# Patient Record
Sex: FEMALE | Race: WHITE | Employment: FULL TIME | URBAN - METROPOLITAN AREA
[De-identification: names, ages, dates, MRNs, and addresses within clinical notes are randomized per-mention and may not be internally consistent; named-entity substitution may affect disease eponyms.]

---

## 2017-06-02 ENCOUNTER — OFFICE VISIT (OUTPATIENT)
Dept: HEMATOLOGY | Age: 57
End: 2017-06-02

## 2017-06-02 VITALS
DIASTOLIC BLOOD PRESSURE: 90 MMHG | WEIGHT: 139.2 LBS | SYSTOLIC BLOOD PRESSURE: 142 MMHG | TEMPERATURE: 98.5 F | HEART RATE: 71 BPM | OXYGEN SATURATION: 96 %

## 2017-06-02 DIAGNOSIS — E83.110 HEREDITARY HEMOCHROMATOSIS (HCC): Primary | ICD-10-CM

## 2017-06-02 PROBLEM — E83.119 HEMOCHROMATOSIS: Status: ACTIVE | Noted: 2017-06-02

## 2017-06-02 NOTE — PROGRESS NOTES
93 [facility] MD Imer, MONE Burt PA-C Nada Finger, MD, [place], [address], NP        [facility], [address] [address]  [address].     [phone]     FAX: 195 [address], [address] [place] [address], [address]     [phone]     FAX: [phone]       Patient Care Team:  Christopher Salazar MD (Gastroenterology)  Problem List  Never Reviewed          Codes Class Noted    Hemochromatosis ICD-10-CM: E83.119  ICD-9-CM: 275.03  6/2/2017            The physicians listed above have asked me to see Elina Stahl in consultation regarding Hemochromatosis and to perform assessment of fibrosis by means of in-office fibroscan analysis. Patient was diagnosed ~10 years ago and had been getting therapeutic phlebotomy every 6 weeks or so on an intermittent basis. This was apparently not inducing enough of a response and she has recently increased the frequency. Getting phlebotomy every 2 weeks for the past 6 weeks and the ferritin is being reassessed in the near future through Dr Shan Huizar office. She is tolerating this procedure reasonably well. Liver biopsy at the time of diagnosis 10 years ago, without significant increased fibrosis. The patient is a 61 y.o.  female who was diagnosed with liver disease in 2007. The patient has no symptoms which could be attributed to the liver disorder other than mild generalized itching. The patient completes all daily activities without any functional limitations.       The patient has not experienced fatigue, fevers, chills, shortness of breath, chest pain, pain in the right side over the liver, diffuse abdominal pain, nausea, vomiting, constipation, diarrhea, dry eyes, dry mouth, arthralgias, myalgias, yellowing of the eyes or skin, dark urine, or problems concentrating. ALLERGIES:  Not on File    MEDICATIONS:  No current outpatient prescriptions on file. No current facility-administered medications for this visit. SYSTEM REVIEW NOT RELATED TO LIVER DISEASE OR REVIEWED ABOVE:  Constitution systems: Negative for fever, chills, weight gain, weight loss. Eyes: Negative for visual changes. ENT: Negative for sore throat, painful swallowing. Respiratory: Negative for cough, hemoptysis, SOB. Cardiology: Negative for chest pain, palpitations. GI:  Negative for constipation or diarrhea. : Negative for urinary frequency, dysuria, hematuria, nocturia. Skin: Negative for rash. Hematology: Negative for easy bruising, blood clots. Musculo-skeletal: Negative for back pain, muscle pain, weakness. Neurologic: Negative for headaches, dizziness, vertigo, memory problems not related to HE. Psychology: Negative for anxiety, depression. FAMILY HISTORY:  A brother has hemochromatosis as well. SOCIAL HISTORY:  The patient is . The patient has 3 children, 2 of whom have been screened and are negative. The patient has never used tobacco products. The patient has never consumed significant amounts of alcohol. The patient currently works full time at [employer]. PHYSICAL EXAMINATION:  Visit Vitals    /90    Pulse 71    Temp 98.5 °F (36.9 °C) (Tympanic)    Wt 139 lb 3.2 oz (63.1 kg)    SpO2 96%     General: No acute distress. Skin: No spider angiomata. No jaundice. No palmar erythema. Abdomen: Soft non-tender. Liver size normal to percussion/palpation. Spleen not palpable. No obvious ascites. Extremities: No edema. No muscle wasting. No gross arthritic changes. Neurologic: Alert and oriented. Cranial nerves grossly intact. No asterixis. LIVER HISTOLOGY:  6/2017. FibroScan performed at [facility] [place]. EkPa was 4.9. Suggested fibrosis level is F0-1.     ASSESSMENT AND PLAN:  Hereditary hemochromatosis with no to minimal fibrosis. Assessment of fibrosis was made through performance of fibroscan in the office today. While this study has not been verified in the setting of hemochromatosis, her low score of <7 is consistent with no to minimal fibrosis for all other verified disease states. The results of the analysis were shared with the patient in the office at the time of the procedure. A copy of the report was provided to the patient and will be forwarded to the referring medical practice. All questions were answered. The patient expressed a clear understanding of the above. She will continue with biweekly therapeutic phlebotomy. Follow-up with referring physician.     Humble Burgos PA-C  Liver [place] [address], [address] [address]  [address]. [phone]

## 2017-06-02 NOTE — MR AVS SNAPSHOT
Visit Information Date & Time Provider Department Dept. Phone Encounter #  
 6/2/2017  1:30 PM [facility] maryann MOYA [phone] [id number] Upcoming Health Maintenance Date Due DTaP/Tdap/Td series (1 - Tdap) 9/10/1980 PAP AKA CERVICAL CYTOLOGY 9/10/1980 BREAST CANCER SCRN MAMMOGRAM 9/10/2009 FOBT Q 1 YEAR AGE 50-75 9/10/2009 INFLUENZA AGE 9 TO ADULT 8/1/2017 Allergies as of 6/2/2017  Review Complete On: 6/2/2017 By: John Lechuga Not on File Current Immunizations  Never Reviewed No immunizations on file. Not reviewed this visit Vitals BP Pulse Temp Weight(growth percentile) SpO2 Smoking Status 142/90 71 98.5 °F (36.9 °C) (Tympanic) 139 lb 3.2 oz (63.1 kg) 96% Current Every Day Smoker Your Updated Medication List  
  
Notice  As of 6/2/2017  1:32 PM  
 You have not been prescribed any medications. Introducing [facility]! Ivon Lane introduces [employer] patient portal. Now you can access parts of your medical record, email your doctor's office, and request medication refills online. 1. In your internet browser, go to [email or web address] 2. Click on the First Time User? Click Here link in the Sign In box. You will see the New Member Sign Up page. 3. Enter your [employer] Access Code exactly as it appears below. You will not need to use this code after youve completed the sign-up process. If you do not sign up before the expiration date, you must request a new code. · [employer] Access Code: [email or web address] Expires: 8/31/2017  1:32 PM 
 
4. Enter the last four digits of your Social Security Number (xxxx) and Date of Birth (mm/dd/yyyy) as indicated and click Submit. You will be taken to the next sign-up page. 5. Create a [employer] ID. This will be your [employer] login ID and cannot be changed, so think of one that is secure and easy to remember. 6. Create a [employer] password. You can change your password at any time. 7. Enter your Password Reset Question and Answer. This can be used at a later time if you forget your password. 8. Enter your e-mail address. You will receive e-mail notification when new information is available in [address] 9. Click Sign Up. You can now view and download portions of your medical record. 10. Click the Download Summary menu link to download a portable copy of your medical information. If you have questions, please visit the Frequently Asked Questions section of the [employer] website. Remember, [employer] is NOT to be used for urgent needs. For medical emergencies, dial 911. Now available from your iPhone and Android! Please provide this summary of care documentation to your next provider. If you have any questions after today's visit, please call [phone].

## 2019-08-30 ENCOUNTER — HOSPITAL ENCOUNTER (OUTPATIENT)
Dept: INFUSION THERAPY | Age: 59
Discharge: HOME | End: 2019-08-30
Payer: COMMERCIAL

## 2019-08-30 VITALS
OXYGEN SATURATION: 96 % | TEMPERATURE: 98.4 F | SYSTOLIC BLOOD PRESSURE: 136 MMHG | RESPIRATION RATE: 18 BRPM | DIASTOLIC BLOOD PRESSURE: 90 MMHG | HEART RATE: 81 BPM

## 2019-08-30 LAB
FERRITIN SERPL-MCNC: 257 NG/ML (ref 8–252)
HCT VFR BLD AUTO: 43.9 % (ref 35–47)
HGB BLD-MCNC: 15.2 G/DL (ref 11.5–16)

## 2019-08-30 PROCEDURE — 82728 ASSAY OF FERRITIN: CPT

## 2019-08-30 PROCEDURE — 36415 COLL VENOUS BLD VENIPUNCTURE: CPT

## 2019-08-30 PROCEDURE — 85018 HEMOGLOBIN: CPT

## 2019-08-30 PROCEDURE — 99195 PHLEBOTOMY: CPT

## 2019-08-30 NOTE — PROGRESS NOTES
[address] at [address] [place] [address] Ave    0382 Patient arrives for Therapeutic Phlebotomy without acute problems. Please see [facility] for complete assessment and education provided. *1 unit of blood phlebotomized from left arm without difficulty. Pt tolerated well. Vital signs stable throughout and prior to discharge, Pt. Tolerated treatment well and discharged without incident. Patient is aware of next [facility] appointment on 9/13/2019.      VITAL SIGNS   Patient Vitals for the past 12 hrs:   Temp Pulse Resp BP SpO2   08/30/19 1450  81  136/90    08/30/19 1250 98.4 °F (36.9 °C) 87 18 131/86 96 %       LAB WORK   Recent Results (from the past 12 hour(s))   FERRITIN    Collection Time: 08/30/19 12:51 PM   Result Value Ref Range    Ferritin 257 (H) 8 - 252 NG/ML   HGB & HCT    Collection Time: 08/30/19 12:51 PM   Result Value Ref Range    HGB 15.2 11.5 - 16.0 g/dL    HCT 43.9 35.0 - 47.0 %

## 2019-09-13 ENCOUNTER — HOSPITAL ENCOUNTER (OUTPATIENT)
Dept: INFUSION THERAPY | Age: 59
Discharge: HOME | End: 2019-09-13
Payer: COMMERCIAL

## 2019-09-13 VITALS
OXYGEN SATURATION: 97 % | HEART RATE: 76 BPM | RESPIRATION RATE: 18 BRPM | SYSTOLIC BLOOD PRESSURE: 109 MMHG | TEMPERATURE: 98.7 F | DIASTOLIC BLOOD PRESSURE: 75 MMHG

## 2019-09-13 LAB
FERRITIN SERPL-MCNC: 202 NG/ML (ref 26–388)
HCT VFR BLD AUTO: 40.1 % (ref 35–47)
HGB BLD-MCNC: 13.8 G/DL (ref 11.5–16)

## 2019-09-13 PROCEDURE — 82728 ASSAY OF FERRITIN: CPT

## 2019-09-13 PROCEDURE — 85018 HEMOGLOBIN: CPT

## 2019-09-13 PROCEDURE — 36415 COLL VENOUS BLD VENIPUNCTURE: CPT

## 2019-09-13 PROCEDURE — 99195 PHLEBOTOMY: CPT

## 2019-09-13 NOTE — PROGRESS NOTES
730 [facility] @ [facility] VISIT NOTE     3391 Patient arrives for Therapeutic Phelbotomy without acute problems. Please see connect care for complete assessment and education provided. 1unit/500mls drawn from R AC over about 10 minutes. Vital signs stable throughout and prior to discharge. Pt. Tolerated treatment well and discharged without incident. Patient/parent is aware of next [facility] appointment on 10/4/2019. Appointment card given to patient.      VITAL SIGNS   Patient Vitals for the past 12 hrs:   Temp Pulse Resp BP SpO2   09/13/19 1415  76 18 109/75    09/13/19 1303 98.7 °F (37.1 °C) 80 18 117/78 97 %     LAB WORK   Recent Results (from the past 12 hour(s))   FERRITIN    Collection Time: 09/13/19  1:07 PM   Result Value Ref Range    Ferritin 202 26 - 388 NG/ML   HGB & HCT    Collection Time: 09/13/19  1:07 PM   Result Value Ref Range    HGB 13.8 11.5 - 16.0 g/dL    HCT 40.1 35.0 - 47.0 %

## 2019-10-04 ENCOUNTER — HOSPITAL ENCOUNTER (OUTPATIENT)
Dept: INFUSION THERAPY | Age: 59
Discharge: HOME | End: 2019-10-04
Payer: COMMERCIAL

## 2019-10-04 VITALS
SYSTOLIC BLOOD PRESSURE: 118 MMHG | RESPIRATION RATE: 16 BRPM | TEMPERATURE: 97.9 F | OXYGEN SATURATION: 95 % | DIASTOLIC BLOOD PRESSURE: 77 MMHG | HEART RATE: 102 BPM

## 2019-10-04 LAB
FERRITIN SERPL-MCNC: 159 NG/ML (ref 8–252)
HCT VFR BLD AUTO: 43.3 % (ref 35–47)
HGB BLD-MCNC: 15.1 G/DL (ref 11.5–16)

## 2019-10-04 PROCEDURE — 99195 PHLEBOTOMY: CPT

## 2019-10-04 PROCEDURE — 36415 COLL VENOUS BLD VENIPUNCTURE: CPT

## 2019-10-04 PROCEDURE — 82728 ASSAY OF FERRITIN: CPT

## 2019-10-04 PROCEDURE — 85018 HEMOGLOBIN: CPT

## 2019-10-04 NOTE — PROGRESS NOTES
730 [address] [facility] @ [facility] VISIT NOTE    1300 Patient arrives for Therapeutic Phlebotomy every 2 weeks without acute problems. Please see connect care for complete assessment and education provided. Vital signs stable throughout and prior to discharge, Pt. Tolerated treatment well and discharged without incident. Patient is aware of next [facility] appointment on 10/25/2019. Appointment card given to patient.     VITAL SIGNS   Patient Vitals for the past 12 hrs:   Temp Pulse Resp BP SpO2   10/04/19 1401  (!) 102 16 118/77    10/04/19 1305 97.9 °F (36.6 °C) 95 18 111/76 95 %       Phlebotomized 1 unit per MD order    LAB WORK   Recent Results (from the past 12 hour(s))   HGB & HCT    Collection Time: 10/04/19  1:01 PM   Result Value Ref Range    HGB 15.1 11.5 - 16.0 g/dL    HCT 43.3 35.0 - 47.0 %   FERRITIN    Collection Time: 10/04/19  1:01 PM   Result Value Ref Range    Ferritin 159 8 - 252 NG/ML       Allyn Salazar RN, BSN, CPN, Clin 4

## 2019-10-25 ENCOUNTER — HOSPITAL ENCOUNTER (OUTPATIENT)
Dept: INFUSION THERAPY | Age: 59
Discharge: HOME | End: 2019-10-25
Payer: COMMERCIAL

## 2019-10-25 VITALS
TEMPERATURE: 98.2 F | DIASTOLIC BLOOD PRESSURE: 61 MMHG | RESPIRATION RATE: 18 BRPM | HEART RATE: 92 BPM | SYSTOLIC BLOOD PRESSURE: 106 MMHG

## 2019-10-25 LAB
FERRITIN SERPL-MCNC: 122 NG/ML (ref 8–252)
HCT VFR BLD AUTO: 43.4 % (ref 35–47)
HGB BLD-MCNC: 14.9 G/DL (ref 11.5–16)

## 2019-10-25 PROCEDURE — 85018 HEMOGLOBIN: CPT

## 2019-10-25 PROCEDURE — 99195 PHLEBOTOMY: CPT

## 2019-10-25 PROCEDURE — 82728 ASSAY OF FERRITIN: CPT

## 2019-10-25 PROCEDURE — 36415 COLL VENOUS BLD VENIPUNCTURE: CPT

## 2019-10-25 NOTE — PROGRESS NOTES
Outpatient Infusion Center Progress Note    1300 Pt admit to [place] for labs and possible therapeutic phlebotomy ambulatory in stable condition. Assessment completed. No new concerns voiced. Labs drawn peripherally and sent for processing. Visit Vitals  /61 (BP 1 Location: Right arm, BP Patient Position: Sitting)   Pulse 92   Temp 98.2 °F (36.8 °C)   Resp 18       Based on lab results, therapeutic phlebotomy is indicated. 500 cc red blood removed from left AC via 16 gauge needle. Needle removed and pressure applied to site until bleeding stopped. Site wrapped with coban. 1435 Pt tolerated treatment well. D/c home ambulatory in no distress. Pt aware of next appointment scheduled for 11/15/19.     Recent Results (from the past 12 hour(s))   FERRITIN    Collection Time: 10/25/19  1:05 PM   Result Value Ref Range    Ferritin 122 8 - 252 NG/ML   HGB & HCT    Collection Time: 10/25/19  1:05 PM   Result Value Ref Range    HGB 14.9 11.5 - 16.0 g/dL    HCT 43.4 35.0 - 47.0 %

## 2019-11-15 ENCOUNTER — HOSPITAL ENCOUNTER (OUTPATIENT)
Dept: INFUSION THERAPY | Age: 59
Discharge: HOME | End: 2019-11-15
Payer: COMMERCIAL

## 2019-11-15 VITALS
DIASTOLIC BLOOD PRESSURE: 75 MMHG | RESPIRATION RATE: 16 BRPM | TEMPERATURE: 97.5 F | OXYGEN SATURATION: 98 % | SYSTOLIC BLOOD PRESSURE: 110 MMHG | HEART RATE: 88 BPM

## 2019-11-15 LAB
FERRITIN SERPL-MCNC: 104 NG/ML (ref 26–388)
HCT VFR BLD AUTO: 42.9 % (ref 35–47)
HGB BLD-MCNC: 14.8 G/DL (ref 11.5–16)

## 2019-11-15 PROCEDURE — 99195 PHLEBOTOMY: CPT

## 2019-11-15 PROCEDURE — 85018 HEMOGLOBIN: CPT

## 2019-11-15 PROCEDURE — 36415 COLL VENOUS BLD VENIPUNCTURE: CPT

## 2019-11-15 PROCEDURE — 82728 ASSAY OF FERRITIN: CPT

## 2019-11-15 NOTE — PROGRESS NOTES
PEDI [facility] VISIT NOTE      1300 Patient arrives for Therapeutic Phlebotomy/lab without acute problems. Please see [facility] for complete assessment and education provided. Peripheral stick to left  hand for lab draw; labs sent for processing. Vitals Signs:  Patient Vitals for the past 12 hrs:   Temp Pulse Resp BP SpO2   11/15/19 1420  88 16 110/75    11/15/19 1301 97.5 °F (36.4 °C) 94 16 104/57 98 %          Labs:  Recent Results (from the past 12 hour(s))   FERRITIN    Collection Time: 11/15/19  1:04 PM   Result Value Ref Range    Ferritin 104 26 - 388 NG/ML   HGB & HCT    Collection Time: 11/15/19  1:04 PM   Result Value Ref Range    HGB 14.8 11.5 - 16.0 g/dL    HCT 42.9 35.0 - 47.0 %     Patient's labs within parameters for treatment; 16 gauge needle used for phlebotomy; 500ml of whole blood removed from the right ac, patient tolerated well. Gauze placed over site and pressure bandage applied. 1430 Vital signs stable throughout and prior to discharge, Patient tolerated treatment well and discharged without incident. Patient/parent is aware of next [facility] appointment.      Future Appointments   Date Time Provider [place]   12/6/2019  1:00 PM [facility]

## 2019-12-06 ENCOUNTER — APPOINTMENT (OUTPATIENT)
Dept: INFUSION THERAPY | Age: 59
End: 2019-12-06
Payer: COMMERCIAL

## 2019-12-11 ENCOUNTER — HOSPITAL ENCOUNTER (OUTPATIENT)
Dept: INFUSION THERAPY | Age: 59
Discharge: HOME | End: 2019-12-11
Payer: COMMERCIAL

## 2019-12-11 VITALS
RESPIRATION RATE: 14 BRPM | DIASTOLIC BLOOD PRESSURE: 71 MMHG | OXYGEN SATURATION: 96 % | TEMPERATURE: 97.6 F | SYSTOLIC BLOOD PRESSURE: 101 MMHG | HEART RATE: 85 BPM

## 2019-12-11 LAB
FERRITIN SERPL-MCNC: 91 NG/ML (ref 26–388)
HCT VFR BLD AUTO: 43.7 % (ref 35–47)
HGB BLD-MCNC: 15.3 G/DL (ref 11.5–16)

## 2019-12-11 PROCEDURE — 82728 ASSAY OF FERRITIN: CPT

## 2019-12-11 PROCEDURE — 36415 COLL VENOUS BLD VENIPUNCTURE: CPT

## 2019-12-11 PROCEDURE — 99195 PHLEBOTOMY: CPT

## 2019-12-11 PROCEDURE — 85018 HEMOGLOBIN: CPT

## 2019-12-11 NOTE — PROGRESS NOTES
730 [facility] @ [facility] VISIT NOTE    1311 Patient arrives for Therapeutic Phlebotomy every 2 weeks without acute problems. Please see connect care for complete assessment and education provided. Vital signs stable throughout and prior to discharge, Pt. Tolerated treatment well and discharged without incident. Patient is aware of next [facility] appointment on 12/27/2019. Appointment card given to patient.     VITAL SIGNS   Patient Vitals for the past 12 hrs:   Temp Pulse Resp BP SpO2   12/11/19 1407  85 14 101/71    12/11/19 1316 97.6 °F (36.4 °C) 81 16 118/80 96 %     Labs Drawn from Left [facility]  Phlebotomized 1 unit per MD order from Right AC with 16G needle    LAB WORK   Recent Results (from the past 12 hour(s))   FERRITIN    Collection Time: 12/11/19  1:13 PM   Result Value Ref Range    Ferritin 91 26 - 388 NG/ML   HGB & HCT    Collection Time: 12/11/19  1:13 PM   Result Value Ref Range    HGB 15.3 11.5 - 16.0 g/dL    HCT 43.7 35.0 - 47.0 %         Joyce Carpenter RN, BSN, CPN, Clin 4

## 2019-12-27 ENCOUNTER — APPOINTMENT (OUTPATIENT)
Dept: INFUSION THERAPY | Age: 59
End: 2019-12-27
Payer: COMMERCIAL

## 2020-01-10 ENCOUNTER — HOSPITAL ENCOUNTER (OUTPATIENT)
Dept: INFUSION THERAPY | Age: 60
Discharge: HOME | End: 2020-01-10
Payer: COMMERCIAL

## 2020-01-10 VITALS
SYSTOLIC BLOOD PRESSURE: 120 MMHG | RESPIRATION RATE: 16 BRPM | HEART RATE: 106 BPM | DIASTOLIC BLOOD PRESSURE: 49 MMHG | OXYGEN SATURATION: 96 % | TEMPERATURE: 97.4 F

## 2020-01-10 LAB
FERRITIN SERPL-MCNC: 111 NG/ML (ref 8–252)
HCT VFR BLD AUTO: 45.7 % (ref 35–47)
HGB BLD-MCNC: 16.2 G/DL (ref 11.5–16)

## 2020-01-10 PROCEDURE — 36415 COLL VENOUS BLD VENIPUNCTURE: CPT

## 2020-01-10 PROCEDURE — 85018 HEMOGLOBIN: CPT

## 2020-01-10 PROCEDURE — 99195 PHLEBOTOMY: CPT

## 2020-01-10 PROCEDURE — 82728 ASSAY OF FERRITIN: CPT

## 2020-01-10 RX ORDER — LANOLIN ALCOHOL/MO/W.PET/CERES
1 CREAM (GRAM) TOPICAL DAILY
COMMUNITY

## 2020-01-10 RX ORDER — MELATONIN
2000 DAILY
COMMUNITY

## 2020-01-10 NOTE — PROGRESS NOTES
730 [address] [facility] @ [facility] VISIT NOTE     0695 Patient arrives for Therapeutic Phlebotomy every 2 Weeks without acute problems. Please see connect care for complete assessment and education provided. Vital signs stable throughout and prior to discharge, Pt. Tolerated treatment well and discharged without incident. Patient is aware of next [facility] appointment on 01/24/2020. Appointment card given to patient. VITAL SIGNS  Patient Vitals for the past 12 hrs:   Temp Pulse Resp BP SpO2   01/10/20 1437  (!) 106 16 120/49    01/10/20 1310     96 %   01/10/20 1305 97.4 °F (36.3 °C) 98 16 115/79 96 %     Labs drawn via Right AV  Patient Phlebotomized 1 unit per MD order from Right AC with 16G Needle.      LAB WORK  Recent Results (from the past 12 hour(s))   HGB & HCT    Collection Time: 01/10/20  1:05 PM   Result Value Ref Range    HGB 16.2 (H) 11.5 - 16.0 g/dL    HCT 45.7 35.0 - 47.0 %   FERRITIN    Collection Time: 01/10/20  1:05 PM   Result Value Ref Range    Ferritin 111 8 - 252 NG/ML

## 2020-01-24 ENCOUNTER — HOSPITAL ENCOUNTER (OUTPATIENT)
Dept: INFUSION THERAPY | Age: 60
End: 2020-01-24
Payer: COMMERCIAL

## 2020-01-31 ENCOUNTER — HOSPITAL ENCOUNTER (OUTPATIENT)
Dept: INFUSION THERAPY | Age: 60
Discharge: HOME | End: 2020-01-31
Payer: COMMERCIAL

## 2020-01-31 VITALS
TEMPERATURE: 97.2 F | HEART RATE: 90 BPM | DIASTOLIC BLOOD PRESSURE: 72 MMHG | SYSTOLIC BLOOD PRESSURE: 111 MMHG | RESPIRATION RATE: 16 BRPM

## 2020-01-31 LAB
FERRITIN SERPL-MCNC: 91 NG/ML (ref 8–252)
HCT VFR BLD AUTO: 41.7 % (ref 35–47)
HGB BLD-MCNC: 14.5 G/DL (ref 11.5–16)

## 2020-01-31 PROCEDURE — 85018 HEMOGLOBIN: CPT

## 2020-01-31 PROCEDURE — 82728 ASSAY OF FERRITIN: CPT

## 2020-01-31 PROCEDURE — 99195 PHLEBOTOMY: CPT

## 2020-01-31 PROCEDURE — 36415 COLL VENOUS BLD VENIPUNCTURE: CPT

## 2020-01-31 NOTE — PROGRESS NOTES
730 [facility] @ [facility] VISIT NOTE    7866 Patient arrives for Therapeutic Phlebotomy every 2 weeks without acute problems. Please see connect care for complete assessment and education provided. Vital signs stable throughout and prior to discharge, Pt. Tolerated treatment well and discharged without incident. Patient is aware of next [facility] appointment on 2/21/2020. Appointment card given to patient.     VITAL SIGNS   Patient Vitals for the past 12 hrs:   Temp Pulse Resp BP   01/31/20 1408 97.2 °F (36.2 °C) 90 16 111/72       Labs Drawn from Right [facility]  Phlebotomized 1 unit per MD order from Left AC with 16G needle      LAB WORK   Recent Results (from the past 12 hour(s))   HGB & HCT    Collection Time: 01/31/20  2:05 PM   Result Value Ref Range    HGB 14.5 11.5 - 16.0 g/dL    HCT 41.7 35.0 - 47.0 %

## 2020-02-07 ENCOUNTER — APPOINTMENT (OUTPATIENT)
Dept: INFUSION THERAPY | Age: 60
End: 2020-02-07
Payer: COMMERCIAL

## 2020-02-21 ENCOUNTER — HOSPITAL ENCOUNTER (OUTPATIENT)
Dept: INFUSION THERAPY | Age: 60
Discharge: HOME | End: 2020-02-21
Payer: COMMERCIAL

## 2020-02-21 ENCOUNTER — APPOINTMENT (OUTPATIENT)
Dept: INFUSION THERAPY | Age: 60
End: 2020-02-21
Payer: COMMERCIAL

## 2020-02-21 VITALS
SYSTOLIC BLOOD PRESSURE: 113 MMHG | RESPIRATION RATE: 14 BRPM | TEMPERATURE: 97.7 F | DIASTOLIC BLOOD PRESSURE: 78 MMHG | HEART RATE: 84 BPM

## 2020-02-21 LAB
FERRITIN SERPL-MCNC: 68 NG/ML (ref 26–388)
HCT VFR BLD AUTO: 41.6 % (ref 35–47)
HGB BLD-MCNC: 14.6 G/DL (ref 11.5–16)

## 2020-02-21 PROCEDURE — 99195 PHLEBOTOMY: CPT

## 2020-02-21 PROCEDURE — 82728 ASSAY OF FERRITIN: CPT

## 2020-02-21 PROCEDURE — 85018 HEMOGLOBIN: CPT

## 2020-02-21 PROCEDURE — 36415 COLL VENOUS BLD VENIPUNCTURE: CPT

## 2020-02-21 RX ORDER — VARENICLINE TARTRATE 1 MG/1
1 TABLET, FILM COATED ORAL 2 TIMES DAILY
COMMUNITY
End: 2020-05-01

## 2020-02-21 NOTE — PROGRESS NOTES
730 [address] [facility] @ [facility] VISIT NOTE    2800 Patient arrives for Therapeutic Phlebotomy every 2 weeks without acute problems. Please see connect care for complete assessment and education provided. Vital signs stable throughout and prior to discharge, Pt. Tolerated treatment well and discharged without incident. Patient is aware of next [facility] appointment on 3/6/2020. Appointment card given to patient.     VITAL SIGNS   Patient Vitals for the past 12 hrs:   Temp Pulse Resp BP   02/21/20 1523  84 14 113/78   02/21/20 1408 97.7 °F (36.5 °C) 81 16 115/75     Labs Drawn from Right AC  Phlebotomized 1 unit per MD order from [address] [place] [address] 16G needle    LAB WORK   Recent Results (from the past 12 hour(s))   FERRITIN    Collection Time: 02/21/20  2:05 PM   Result Value Ref Range    Ferritin 68 26 - 388 NG/ML   HGB & HCT    Collection Time: 02/21/20  2:05 PM   Result Value Ref Range    HGB 14.6 11.5 - 16.0 g/dL    HCT 41.6 35.0 - 47.0 %

## 2020-03-06 ENCOUNTER — HOSPITAL ENCOUNTER (OUTPATIENT)
Dept: INFUSION THERAPY | Age: 60
Discharge: HOME | End: 2020-03-06
Payer: COMMERCIAL

## 2020-03-06 VITALS
SYSTOLIC BLOOD PRESSURE: 122 MMHG | HEART RATE: 82 BPM | TEMPERATURE: 98.2 F | DIASTOLIC BLOOD PRESSURE: 76 MMHG | RESPIRATION RATE: 16 BRPM

## 2020-03-06 LAB
FERRITIN SERPL-MCNC: 47 NG/ML (ref 26–388)
HCT VFR BLD AUTO: 40.2 % (ref 35–47)
HGB BLD-MCNC: 13.9 G/DL (ref 11.5–16)

## 2020-03-06 PROCEDURE — 36415 COLL VENOUS BLD VENIPUNCTURE: CPT

## 2020-03-06 PROCEDURE — 82728 ASSAY OF FERRITIN: CPT

## 2020-03-06 PROCEDURE — 85018 HEMOGLOBIN: CPT

## 2020-03-06 NOTE — PROGRESS NOTES
PEDI [facility] VISIT NOTE      9512 Patient arrives for Therapeutic Phlebotomy/lab q 2 Weekswithout acute problems. Please see connect [facility] for complete assessment and education provided. Peripheral stick to left hand for lab draw; labs sent for processing. Vitals Signs:  Patient Vitals for the past 12 hrs:   Temp Pulse Resp BP   03/06/20 1425 98.2 °F (36.8 °C) 82 16 122/76     Labs:  Recent Results (from the past 12 hour(s))   HGB & HCT    Collection Time: 03/06/20  2:34 PM   Result Value Ref Range    HGB 13.9 11.5 - 16.0 g/dL    HCT 40.2 35.0 - 47.0 %   FERRITIN    Collection Time: 03/06/20  2:34 PM   Result Value Ref Range    Ferritin 47 26 - 388 NG/ML     Patient's Phlebotomy held due to lab not in parameters for treatment; patient's Ferritin = 47 with MD orders to pull blood for Ferritin > 50. Vital signs stable throughout and prior to discharge, Patient tolerated treatment well and discharged without incident. Patient is aware of next [facility] appointment 03/20/2020.

## 2020-03-20 ENCOUNTER — HOSPITAL ENCOUNTER (OUTPATIENT)
Dept: INFUSION THERAPY | Age: 60
End: 2020-03-20
Payer: COMMERCIAL

## 2020-04-03 ENCOUNTER — HOSPITAL ENCOUNTER (OUTPATIENT)
Dept: INFUSION THERAPY | Age: 60
Discharge: HOME | End: 2020-04-03
Payer: COMMERCIAL

## 2020-04-03 VITALS
SYSTOLIC BLOOD PRESSURE: 97 MMHG | RESPIRATION RATE: 16 BRPM | HEART RATE: 83 BPM | DIASTOLIC BLOOD PRESSURE: 63 MMHG | TEMPERATURE: 97.8 F

## 2020-04-03 LAB
FERRITIN SERPL-MCNC: 50 NG/ML (ref 26–388)
HCT VFR BLD AUTO: 39.7 % (ref 35–47)
HGB BLD-MCNC: 14.3 G/DL (ref 11.5–16)

## 2020-04-03 PROCEDURE — 82728 ASSAY OF FERRITIN: CPT

## 2020-04-03 PROCEDURE — 36415 COLL VENOUS BLD VENIPUNCTURE: CPT

## 2020-04-03 PROCEDURE — 85018 HEMOGLOBIN: CPT

## 2020-04-03 NOTE — PROGRESS NOTES
Outpatient Infusion Center Progress Note    1300 Pt admit to [facility] for labs and possible therapeutic phlebotomy ambulatory in stable condition. Assessment completed. No new concerns voiced. Labs drawn and sent for processing. Visit Vitals  BP 97/63 (BP 1 Location: Left arm, BP Patient Position: Sitting)   Pulse 83   Temp 97.8 °F (36.6 °C)   Resp 16   Breastfeeding No       No phlebotomy needed based on lab results. 1425 Pt tolerated treatment well. D/c home ambulatory in no distress. Pt aware of next appointment scheduled for 4/17/20.     Recent Results (from the past 12 hour(s))   HGB & HCT    Collection Time: 04/03/20  1:07 PM   Result Value Ref Range    HGB 14.3 11.5 - 16.0 g/dL    HCT 39.7 35.0 - 47.0 %   FERRITIN    Collection Time: 04/03/20  1:07 PM   Result Value Ref Range    Ferritin 50 26 - 388 NG/ML

## 2020-04-17 ENCOUNTER — APPOINTMENT (OUTPATIENT)
Dept: INFUSION THERAPY | Age: 60
End: 2020-04-17
Payer: COMMERCIAL

## 2020-04-17 ENCOUNTER — HOSPITAL ENCOUNTER (OUTPATIENT)
Dept: INFUSION THERAPY | Age: 60
Discharge: HOME | End: 2020-04-17
Payer: COMMERCIAL

## 2020-04-17 VITALS
SYSTOLIC BLOOD PRESSURE: 117 MMHG | TEMPERATURE: 97.6 F | RESPIRATION RATE: 16 BRPM | HEART RATE: 84 BPM | DIASTOLIC BLOOD PRESSURE: 77 MMHG

## 2020-04-17 LAB
FERRITIN SERPL-MCNC: 57 NG/ML (ref 26–388)
HCT VFR BLD AUTO: 38.7 % (ref 35–47)
HGB BLD-MCNC: 13.8 G/DL (ref 11.5–16)

## 2020-04-17 PROCEDURE — 36415 COLL VENOUS BLD VENIPUNCTURE: CPT

## 2020-04-17 PROCEDURE — 85018 HEMOGLOBIN: CPT

## 2020-04-17 PROCEDURE — 99195 PHLEBOTOMY: CPT

## 2020-04-17 PROCEDURE — 82728 ASSAY OF FERRITIN: CPT

## 2020-04-17 NOTE — PROGRESS NOTES
Outpatient Infusion Center Progress Note    1400 Pt admit to [facility] for labs and possible therapeutic phlebotomy ambulatory in stable condition. Assessment completed. No new concerns voiced. Labs drawn peripherally and sent for processing. Visit Vitals  /77 (BP 1 Location: Right arm, BP Patient Position: Sitting)   Pulse 84   Temp 97.6 °F (36.4 °C)   Resp 16   Breastfeeding No       Based on lab results, therapeutic phlebotomy is indicated. 500 cc red blood removed via 16 gauge needle from left AC without difficulty. Needle removed and pressure held until bleeding stopped. Site wrapped with coban. Patient declined to have a snack or drink. 1525 Pt tolerated treatment well. D/c home ambulatory in no distress. Pt aware of next appointment scheduled for 5/1/20 at 2:00.     Recent Results (from the past 12 hour(s))   FERRITIN    Collection Time: 04/17/20  2:05 PM   Result Value Ref Range    Ferritin 57 26 - 388 NG/ML   HGB & HCT    Collection Time: 04/17/20  2:05 PM   Result Value Ref Range    HGB 13.8 11.5 - 16.0 g/dL    HCT 38.7 35.0 - 47.0 %

## 2020-05-01 ENCOUNTER — HOSPITAL ENCOUNTER (OUTPATIENT)
Dept: INFUSION THERAPY | Age: 60
Discharge: HOME | End: 2020-05-01
Payer: COMMERCIAL

## 2020-05-01 VITALS
TEMPERATURE: 98.4 F | RESPIRATION RATE: 16 BRPM | DIASTOLIC BLOOD PRESSURE: 70 MMHG | HEART RATE: 84 BPM | SYSTOLIC BLOOD PRESSURE: 113 MMHG

## 2020-05-01 LAB
FERRITIN SERPL-MCNC: 43 NG/ML (ref 8–252)
HCT VFR BLD AUTO: 41.6 % (ref 35–47)
HGB BLD-MCNC: 14.3 G/DL (ref 11.5–16)

## 2020-05-01 PROCEDURE — 82728 ASSAY OF FERRITIN: CPT

## 2020-05-01 PROCEDURE — 36415 COLL VENOUS BLD VENIPUNCTURE: CPT

## 2020-05-01 PROCEDURE — 85018 HEMOGLOBIN: CPT

## 2020-05-01 NOTE — PROGRESS NOTES
Outpatient [facility] Short Visit Progress Note    9832 Patient admitted to [place] for Therapeutic phlebotomy ambulatory in stable condition. Assessment completed. No new concerns voiced. Labs drawn from left [facility] and sent for processing. Results are not within parameters to treat. Hct 43. Did not proceed with treatment today. Vital Signs:  Visit Vitals  /70 (BP 1 Location: Left arm, BP Patient Position: Sitting)   Pulse 84   Temp 98.4 °F (36.9 °C)   Resp 16     Recent Results (from the past 12 hour(s))   HGB & HCT    Collection Time: 05/01/20  9:03 AM   Result Value Ref Range    HGB 14.3 11.5 - 16.0 g/dL    HCT 41.6 35.0 - 47.0 %   FERRITIN    Collection Time: 05/01/20  9:03 AM   Result Value Ref Range    Ferritin 43 8 - 252 NG/ML     0950 Patient tolerated treatment well. Patient discharged from [place] [address] ambulatory in no distress at 0950. Patient aware of next appointment 5/15/20.

## 2020-05-15 ENCOUNTER — HOSPITAL ENCOUNTER (OUTPATIENT)
Dept: INFUSION THERAPY | Age: 60
Discharge: HOME | End: 2020-05-15
Payer: COMMERCIAL

## 2020-05-15 VITALS
HEART RATE: 84 BPM | SYSTOLIC BLOOD PRESSURE: 134 MMHG | TEMPERATURE: 98.9 F | RESPIRATION RATE: 16 BRPM | DIASTOLIC BLOOD PRESSURE: 87 MMHG | OXYGEN SATURATION: 96 %

## 2020-05-15 LAB
FERRITIN SERPL-MCNC: 59 NG/ML (ref 8–252)
HCT VFR BLD AUTO: 41.3 % (ref 35–47)
HGB BLD-MCNC: 13.9 G/DL (ref 11.5–16)

## 2020-05-15 PROCEDURE — 82728 ASSAY OF FERRITIN: CPT

## 2020-05-15 PROCEDURE — 99195 PHLEBOTOMY: CPT

## 2020-05-15 PROCEDURE — 36415 COLL VENOUS BLD VENIPUNCTURE: CPT

## 2020-05-15 PROCEDURE — 85018 HEMOGLOBIN: CPT

## 2020-05-15 NOTE — PROGRESS NOTES
PEDI [facility] VISIT NOTE      0900 Patient arrives for Therapeutic Phlebotomy/lab without acute problems. Please see [facility] for complete assessment and education provided. Peripheral stick to right hand for lab draw; labs sent for processing. Vitals Signs:  Patient Vitals for the past 12 hrs:   Temp Pulse Resp BP SpO2   05/15/20 1036  84 16 134/87    05/15/20 0904 98.9 °F (37.2 °C) 87 16 113/79 96 %      Labs:  Recent Results (from the past 12 hour(s))   HGB & HCT    Collection Time: 05/15/20  9:05 AM   Result Value Ref Range    HGB 13.9 11.5 - 16.0 g/dL    HCT 41.3 35.0 - 47.0 %   FERRITIN    Collection Time: 05/15/20  9:05 AM   Result Value Ref Range    Ferritin 59 8 - 252 NG/ML     Patient's labs within parameters for treatment; 16 gauge needle used for phlebotomy via patient's Left AC; 500ml of whole blood removed, patient tolerated well. Gauze placed over site and pressure bandage applied. Vital signs stable throughout and prior to discharge, Patient tolerated treatment well and discharged without incident. Patient is aware of next [facility] appointment 06/12/2020.

## 2020-06-05 ENCOUNTER — HOSPITAL ENCOUNTER (OUTPATIENT)
Dept: INFUSION THERAPY | Age: 60
Discharge: HOME | End: 2020-06-05
Payer: COMMERCIAL

## 2020-06-05 VITALS
TEMPERATURE: 98.6 F | HEART RATE: 79 BPM | RESPIRATION RATE: 16 BRPM | DIASTOLIC BLOOD PRESSURE: 85 MMHG | SYSTOLIC BLOOD PRESSURE: 131 MMHG

## 2020-06-05 LAB
FERRITIN SERPL-MCNC: 42 NG/ML (ref 26–388)
HCT VFR BLD AUTO: 39.6 % (ref 35–47)
HGB BLD-MCNC: 13.7 G/DL (ref 11.5–16)

## 2020-06-05 PROCEDURE — 36415 COLL VENOUS BLD VENIPUNCTURE: CPT

## 2020-06-05 PROCEDURE — 85018 HEMOGLOBIN: CPT

## 2020-06-05 PROCEDURE — 82728 ASSAY OF FERRITIN: CPT

## 2020-06-05 NOTE — PROGRESS NOTES
[address] [facility] @ [facility] VISIT NOTE     5508 Patient arrives for Therapeutic Phlebotomy/Labs Q 4 Weeks without acute problems. Please see connect care for complete assessment and education provided. Vital signs stable throughout and prior to discharge, Pt. Tolerated treatment well and discharged without incident. Patient is aware of next [facility] appointment on 07/02/2020. Appointment card given to patient. Peripheral stick to right hand for lab draw; labs sent for processing. Patient's Phlebotomy held due to lab not in parameters for treatment; patients Ferritin = 42 with MD orders to hold Phlebotomy for Ferritin > 50.        VITAL SIGNS  Patient Vitals for the past 12 hrs:   Temp Pulse Resp BP   06/05/20 1114 98.6 °F (37 °C) 79 16 131/85     LAB WORK  Recent Results (from the past 12 hour(s))   HGB & HCT    Collection Time: 06/05/20 11:15 AM   Result Value Ref Range    HGB 13.7 11.5 - 16.0 g/dL    HCT 39.6 35.0 - 47.0 %   FERRITIN    Collection Time: 06/05/20 11:15 AM   Result Value Ref Range    Ferritin 42 26 - 388 NG/ML

## 2020-06-12 ENCOUNTER — APPOINTMENT (OUTPATIENT)
Dept: INFUSION THERAPY | Age: 60
End: 2020-06-12
Payer: COMMERCIAL

## 2020-07-02 ENCOUNTER — HOSPITAL ENCOUNTER (OUTPATIENT)
Dept: INFUSION THERAPY | Age: 60
End: 2020-07-02
Payer: COMMERCIAL

## 2020-07-10 ENCOUNTER — HOSPITAL ENCOUNTER (OUTPATIENT)
Dept: INFUSION THERAPY | Age: 60
Discharge: HOME | End: 2020-07-10
Payer: COMMERCIAL

## 2020-07-10 VITALS
HEART RATE: 84 BPM | RESPIRATION RATE: 16 BRPM | TEMPERATURE: 98.4 F | SYSTOLIC BLOOD PRESSURE: 126 MMHG | DIASTOLIC BLOOD PRESSURE: 84 MMHG

## 2020-07-10 LAB
FERRITIN SERPL-MCNC: 56 NG/ML (ref 26–388)
HCT VFR BLD AUTO: 40.7 % (ref 35–47)
HGB BLD-MCNC: 13.9 G/DL (ref 11.5–16)

## 2020-07-10 PROCEDURE — 85018 HEMOGLOBIN: CPT

## 2020-07-10 PROCEDURE — 99195 PHLEBOTOMY: CPT

## 2020-07-10 PROCEDURE — 82728 ASSAY OF FERRITIN: CPT

## 2020-07-10 PROCEDURE — 36415 COLL VENOUS BLD VENIPUNCTURE: CPT

## 2020-07-10 NOTE — PROGRESS NOTES
[address] [facility] @ [facility] VISIT NOTE    9978 Patient arrives for Labs & Therapeutic Phlebotomy without acute problems. Please see connect care for complete assessment and education provided. Vital signs stable throughout and prior to discharge, Pt. Tolerated treatment well and discharged without incident. Patient is aware of next [facility] appointment on 8/6/2020. Appointment card given to patient. Labs obtained in [facility] by Nadine Barbosa. Phlebotomy indicated. Therapeutic phlebotomy performed in left AC via 16ga phlebotomy needle. Pt tolerated well. VITAL SIGNS   Patient Vitals for the past 12 hrs:   Temp Pulse Resp BP   07/10/20 1659 98.4 °F (36.9 °C) 84 16 126/84   07/10/20 1517 98.1 °F (36.7 °C) 85 16 124/82       LAB WORK Lab results pending, please see [facility] for results.   Recent Results (from the past 12 hour(s))   HGB & HCT    Collection Time: 07/10/20  3:18 PM   Result Value Ref Range    HGB 13.9 11.5 - 16.0 g/dL    HCT 40.7 35.0 - 47.0 %   FERRITIN    Collection Time: 07/10/20  3:55 PM   Result Value Ref Range    Ferritin 56 26 - 388 NG/ML

## 2020-08-06 ENCOUNTER — HOSPITAL ENCOUNTER (OUTPATIENT)
Dept: INFUSION THERAPY | Age: 60
Discharge: HOME | End: 2020-08-06

## 2020-08-12 ENCOUNTER — HOSPITAL ENCOUNTER (OUTPATIENT)
Dept: INFUSION THERAPY | Age: 60
Discharge: HOME | End: 2020-08-12
Payer: COMMERCIAL

## 2020-08-12 VITALS
SYSTOLIC BLOOD PRESSURE: 118 MMHG | RESPIRATION RATE: 18 BRPM | TEMPERATURE: 98.6 F | HEART RATE: 87 BPM | DIASTOLIC BLOOD PRESSURE: 85 MMHG

## 2020-08-12 LAB
FERRITIN SERPL-MCNC: 61 NG/ML (ref 26–388)
HCT VFR BLD AUTO: 39.7 % (ref 35–47)
HGB BLD-MCNC: 14 G/DL (ref 11.5–16)

## 2020-08-12 PROCEDURE — 99195 PHLEBOTOMY: CPT

## 2020-08-12 PROCEDURE — 85018 HEMOGLOBIN: CPT

## 2020-08-12 PROCEDURE — 36415 COLL VENOUS BLD VENIPUNCTURE: CPT

## 2020-08-12 PROCEDURE — 82728 ASSAY OF FERRITIN: CPT

## 2020-08-12 NOTE — PROGRESS NOTES
PEDI [facility] VISIT NOTE      1032 Patient arrives for Therapeutic Phlebotomy/lab without acute problems. Please see [facility] for complete assessment and education provided. Peripheral stick to left ac for lab draw; labs sent for processing. Vitals Signs:     Patient Vitals for the past 12 hrs:   Temp Pulse Resp BP   08/12/20 1520 98.6 °F (37 °C) 87 18 118/85   08/12/20 1410 98.6 °F (37 °C) 99 18 122/81        Labs:  Recent Results (from the past 12 hour(s))   HGB & HCT    Collection Time: 08/12/20  2:13 PM   Result Value Ref Range    HGB 14.0 11.5 - 16.0 g/dL    HCT 39.7 35.0 - 47.0 %   FERRITIN    Collection Time: 08/12/20  2:13 PM   Result Value Ref Range    Ferritin 61 26 - 388 NG/ML     Patient's labs within parameters for treatment; 16 gauge needle used for phlebotomy; 500ml of whole blood removed, patient tolerated well. Gauze placed over site and pressure bandage applied. Vital signs stable throughout and prior to discharge, Patient tolerated treatment well and discharged without incident. Patient/parent is aware of next [facility] appointment 09/11/2020 @ 1400.

## 2020-09-11 ENCOUNTER — HOSPITAL ENCOUNTER (OUTPATIENT)
Dept: INFUSION THERAPY | Age: 60
Discharge: HOME | End: 2020-09-11
Payer: COMMERCIAL

## 2020-09-11 VITALS
HEART RATE: 89 BPM | RESPIRATION RATE: 18 BRPM | DIASTOLIC BLOOD PRESSURE: 75 MMHG | TEMPERATURE: 98.2 F | SYSTOLIC BLOOD PRESSURE: 116 MMHG

## 2020-09-11 LAB
FERRITIN SERPL-MCNC: 49 NG/ML (ref 8–252)
HCT VFR BLD AUTO: 40.8 % (ref 35–47)
HGB BLD-MCNC: 14.1 G/DL (ref 11.5–16)

## 2020-09-11 PROCEDURE — 82728 ASSAY OF FERRITIN: CPT

## 2020-09-11 PROCEDURE — 36415 COLL VENOUS BLD VENIPUNCTURE: CPT

## 2020-09-11 PROCEDURE — 85018 HEMOGLOBIN: CPT

## 2020-09-11 NOTE — PROGRESS NOTES
Outpatient Infusion Center Progress Note    5199 Pt admit to [facility] for labs and therapeutic phlebotomy ambulatory in stable condition. Assessment completed. No new concerns voiced. Labs drawn peripherally and sent for processing. Visit Vitals  /75 (BP 1 Location: Left arm, BP Patient Position: Sitting)   Pulse 89   Temp 98.2 °F (36.8 °C)   Resp 18   Breastfeeding No       1430 Patient discharged due to not having complete orders. Orders only list lab work to be completed. MD office called, however MD is out of the office until Monday. Patient agreeable to return next week for phlebotomy if needed and new orders are obtained. D/c home ambulatory in no distress. Pt aware of next appointment scheduled for 10/9/20. Recent Results (from the past 12 hour(s))   HGB & HCT    Collection Time: 09/11/20  2:13 PM   Result Value Ref Range    HGB 14.1 11.5 - 16.0 g/dL    HCT 40.8 35.0 - 47.0 %     Ferritin still pending.

## 2020-10-08 ENCOUNTER — HOSPITAL ENCOUNTER (OUTPATIENT)
Dept: INFUSION THERAPY | Age: 60
Discharge: HOME | End: 2020-10-08
Payer: COMMERCIAL

## 2020-10-08 VITALS
RESPIRATION RATE: 18 BRPM | TEMPERATURE: 98.3 F | HEART RATE: 101 BPM | SYSTOLIC BLOOD PRESSURE: 117 MMHG | DIASTOLIC BLOOD PRESSURE: 82 MMHG

## 2020-10-08 LAB
FERRITIN SERPL-MCNC: 52 NG/ML (ref 26–388)
HCT VFR BLD AUTO: 41.3 % (ref 35–47)
HGB BLD-MCNC: 14.3 G/DL (ref 11.5–16)

## 2020-10-08 PROCEDURE — 82728 ASSAY OF FERRITIN: CPT

## 2020-10-08 PROCEDURE — 36415 COLL VENOUS BLD VENIPUNCTURE: CPT

## 2020-10-08 PROCEDURE — 99195 PHLEBOTOMY: CPT

## 2020-10-08 PROCEDURE — 85018 HEMOGLOBIN: CPT

## 2020-10-08 NOTE — PROGRESS NOTES
PEDI [facility] VISIT NOTE      9146 Patient arrives for Therapeutic Phlebotomy/lab without acute problems. Please see [facility] for complete assessment and education provided. Peripheral stick to right ac for lab draw; labs sent for processing. Vitals Signs:     Patient Vitals for the past 12 hrs:   Temp Pulse Resp BP   10/08/20 1410 98.3 °F (36.8 °C) (!) 101 18 117/82     Labs:  Recent Results (from the past 12 hour(s))   HGB & HCT    Collection Time: 10/08/20  2:18 PM   Result Value Ref Range    HGB 14.3 11.5 - 16.0 g/dL    HCT 41.3 35.0 - 47.0 %   FERRITIN    Collection Time: 10/08/20  2:18 PM   Result Value Ref Range    Ferritin 52 26 - 388 NG/ML     Patient's labs within parameters for treatment; 16 gauge needle used for phlebotomy in left ac; 500ml of whole blood removed, patient tolerated well. Gauze placed over site and pressure bandage applied. Vital signs stable throughout and prior to discharge, Patient tolerated treatment well and discharged without incident. Patient/parent is aware of next [facility] appointment 11/05/2020.

## 2020-10-09 ENCOUNTER — APPOINTMENT (OUTPATIENT)
Dept: INFUSION THERAPY | Age: 60
End: 2020-10-09

## 2020-11-05 ENCOUNTER — HOSPITAL ENCOUNTER (OUTPATIENT)
Dept: INFUSION THERAPY | Age: 60
Discharge: HOME | End: 2020-11-05
Payer: COMMERCIAL

## 2020-11-05 VITALS
RESPIRATION RATE: 16 BRPM | SYSTOLIC BLOOD PRESSURE: 134 MMHG | HEART RATE: 78 BPM | TEMPERATURE: 98.2 F | DIASTOLIC BLOOD PRESSURE: 88 MMHG | OXYGEN SATURATION: 98 %

## 2020-11-05 LAB
FERRITIN SERPL-MCNC: 39 NG/ML (ref 26–388)
HCT VFR BLD AUTO: 41.4 % (ref 35–47)
HGB BLD-MCNC: 14.6 G/DL (ref 11.5–16)

## 2020-11-05 PROCEDURE — 36415 COLL VENOUS BLD VENIPUNCTURE: CPT

## 2020-11-05 PROCEDURE — 82728 ASSAY OF FERRITIN: CPT

## 2020-11-05 PROCEDURE — 85018 HEMOGLOBIN: CPT

## 2020-11-05 NOTE — PROGRESS NOTES
[facility]    Date: 2020    Name: Lee Ann Tello    MRN: [id number]         : 1960    Ms. Valladares Arrived ambulatory and in no distress for Therapeutic Phlebotomy. Assessment was completed, no acute issues at this time, no new complaints voiced. Labs drawn from [facility] without difficulty. Ms. Dae Mittal vitals were reviewed. Patient Vitals for the past 12 hrs:   Temp Pulse Resp BP SpO2   20 1415 98.2 °F (36.8 °C) 78 16 134/88 98 %       Lab results were obtained and reviewed. Recent Results (from the past 12 hour(s))   HGB & HCT    Collection Time: 20  2:19 PM   Result Value Ref Range    HGB 14.6 11.5 - 16.0 g/dL    HCT 41.4 35.0 - 47.0 %   FERRITIN    Collection Time: 20  2:19 PM   Result Value Ref Range    Ferritin 39 26 - 388 NG/ML     TREATMENT HELD due to ferritin < 50. Ms. Conrad Gutiérrez was discharged from [place] [address] in stable condition at [phone]. She is to return on December 3 at 1400 for her next appointment.     Vidya Wilks RN  2020

## 2020-11-06 ENCOUNTER — APPOINTMENT (OUTPATIENT)
Dept: INFUSION THERAPY | Age: 60
End: 2020-11-06

## 2020-12-03 ENCOUNTER — HOSPITAL ENCOUNTER (OUTPATIENT)
Dept: INFUSION THERAPY | Age: 60
Discharge: HOME | End: 2020-12-03
Payer: COMMERCIAL

## 2020-12-03 VITALS
RESPIRATION RATE: 16 BRPM | SYSTOLIC BLOOD PRESSURE: 112 MMHG | TEMPERATURE: 98.4 F | DIASTOLIC BLOOD PRESSURE: 75 MMHG | OXYGEN SATURATION: 97 % | HEART RATE: 88 BPM

## 2020-12-03 LAB
FERRITIN SERPL-MCNC: 44 NG/ML (ref 8–252)
HCT VFR BLD AUTO: 41.8 % (ref 35–47)
HGB BLD-MCNC: 14.6 G/DL (ref 11.5–16)

## 2020-12-03 PROCEDURE — 36415 COLL VENOUS BLD VENIPUNCTURE: CPT

## 2020-12-03 PROCEDURE — 82728 ASSAY OF FERRITIN: CPT

## 2020-12-03 PROCEDURE — 85018 HEMOGLOBIN: CPT

## 2020-12-03 NOTE — PROGRESS NOTES
PEDI [facility] VISIT NOTE      8396 Patient arrives for Therapeutic Phlebotomy/lab without acute problems. Please see [facility] for complete assessment and education provided. Peripheral stick to right hand for lab draw; labs sent for processing. TREATMENT HELD FOR FERRITIN <50    Vitals Signs:     Patient Vitals for the past 12 hrs:   Temp Pulse Resp BP SpO2   12/03/20 1358 98.4 °F (36.9 °C) 88 16 112/75 97 %        Labs:    Recent Results (from the past 12 hour(s))   FERRITIN    Collection Time: 12/03/20  2:18 PM   Result Value Ref Range    Ferritin 44 8 - 252 NG/ML   HGB & HCT    Collection Time: 12/03/20  2:18 PM   Result Value Ref Range    HGB 14.6 11.5 - 16.0 g/dL    HCT 41.8 35.0 - 47.0 %     Vital signs stable throughout and prior to discharge, Patient tolerated lab draw well and discharged without incident. Patient/parent is aware of next [facility] appointment 1/7/2021.

## 2020-12-04 ENCOUNTER — APPOINTMENT (OUTPATIENT)
Dept: INFUSION THERAPY | Age: 60
End: 2020-12-04
Payer: COMMERCIAL

## 2021-01-07 ENCOUNTER — HOSPITAL ENCOUNTER (OUTPATIENT)
Dept: INFUSION THERAPY | Age: 61
Discharge: HOME | End: 2021-01-07
Payer: COMMERCIAL

## 2021-01-07 VITALS
TEMPERATURE: 97.9 F | HEART RATE: 94 BPM | OXYGEN SATURATION: 95 % | RESPIRATION RATE: 16 BRPM | SYSTOLIC BLOOD PRESSURE: 117 MMHG | DIASTOLIC BLOOD PRESSURE: 83 MMHG

## 2021-01-07 LAB
FERRITIN SERPL-MCNC: 56 NG/ML (ref 26–388)
HCT VFR BLD AUTO: 40.7 % (ref 35–47)
HGB BLD-MCNC: 14.2 G/DL (ref 11.5–16)

## 2021-01-07 PROCEDURE — 82728 ASSAY OF FERRITIN: CPT

## 2021-01-07 PROCEDURE — 99195 PHLEBOTOMY: CPT

## 2021-01-07 PROCEDURE — 36415 COLL VENOUS BLD VENIPUNCTURE: CPT

## 2021-01-07 PROCEDURE — 85018 HEMOGLOBIN: CPT

## 2021-01-07 NOTE — PROGRESS NOTES
Outpatient Infusion Center Short Visit Progress Note    Patient admitted to [facility] for Labs/Therapeutic phlebotomy ambulatory in stable condition. Assessment completed. No new concerns voiced. Labs obtained peripherally in right hand and sent for processing. Results are within parameters to treat. Therapeutic phlebotomy 16 g used in left AC. 500 ml of whole blood removed. Patient tolerated treatment well. Manual pressure applied on site, wrapped in 2x 2 gauze and coban. Nourishment provided. Patient tolerated treatment well. Covid Screening      1. Do you have any symptoms of COVID-19? SOB, coughing, fever, or generally not feeling well ? no  2. Have you been exposed to COVID-19 recently? NO  3. Have you had any recent contact with family/friend that has a pending COVID test? NO    Vital Signs:  Visit Vitals  BP (P) 124/73 (BP 1 Location: Left arm, BP Patient Position: Sitting)   Pulse (P) 86   Temp (P) 97.9 °F (36.6 °C)   Resp (P) 16   SpO2 (P) 95%   Breastfeeding No     Patient Vitals for the past 12 hrs:   Temp Pulse Resp BP SpO2   01/07/21 1549 -- 94 -- 117/83 --   01/07/21 1409 97.9 °F (36.6 °C) 86 16 124/73 95 %       Lab Results:  Recent Results (from the past 12 hour(s))   HGB & HCT    Collection Time: 01/07/21  2:17 PM   Result Value Ref Range    HGB 14.2 11.5 - 16.0 g/dL    HCT 40.7 35.0 - 47.0 %   FERRITIN    Collection Time: 01/07/21  2:17 PM   Result Value Ref Range    Ferritin 56 26 - 388 NG/ML     Patient tolerated treatment well. Patient discharged from [place] [address] ambulatory in no distress at. Patient aware of next appointment.     Future Appointments   Date Time Provider Papa Huntley   2/4/2021  2:00 PM B4 [facility]S [facility]   3/4/2021  2:00 PM B4 [facility]   4/1/2021  2:00 PM B4 [facility]S [facility]   4/29/2021  2:00 PM B4 [facility]S [facility]

## 2021-02-04 ENCOUNTER — HOSPITAL ENCOUNTER (OUTPATIENT)
Dept: INFUSION THERAPY | Age: 61
Discharge: HOME | End: 2021-02-04
Payer: COMMERCIAL

## 2021-02-04 VITALS
DIASTOLIC BLOOD PRESSURE: 75 MMHG | SYSTOLIC BLOOD PRESSURE: 120 MMHG | TEMPERATURE: 98 F | HEART RATE: 87 BPM | RESPIRATION RATE: 16 BRPM | OXYGEN SATURATION: 90 %

## 2021-02-04 LAB
FERRITIN SERPL-MCNC: 70 NG/ML (ref 8–252)
HCT VFR BLD AUTO: 40.7 % (ref 35–47)
HGB BLD-MCNC: 14.2 G/DL (ref 11.5–16)

## 2021-02-04 PROCEDURE — 36415 COLL VENOUS BLD VENIPUNCTURE: CPT

## 2021-02-04 PROCEDURE — 85018 HEMOGLOBIN: CPT

## 2021-02-04 PROCEDURE — 99195 PHLEBOTOMY: CPT

## 2021-02-04 PROCEDURE — 82728 ASSAY OF FERRITIN: CPT

## 2021-02-04 NOTE — PROGRESS NOTES
PEDI [facility] VISIT NOTE      1300 Patient arrives for Therapeutic Phlebotomy/lab without acute problems. Please see [facility] for complete assessment and education provided. Peripheral stick to right hand for lab draw; labs sent for processing. Vitals Signs:  Patient Vitals for the past 12 hrs:   Temp Pulse Resp BP SpO2   02/04/21 1444 98 °F (36.7 °C)  16 120/75 90 %   02/04/21 1308 98.1 °F (36.7 °C) 87 16 135/77 96 %     Labs:  Recent Results (from the past 12 hour(s))   FERRITIN    Collection Time: 02/04/21  1:06 PM   Result Value Ref Range    Ferritin 70 8 - 252 NG/ML   HGB & HCT    Collection Time: 02/04/21  1:06 PM   Result Value Ref Range    HGB 14.2 11.5 - 16.0 g/dL    HCT 40.7 35.0 - 47.0 %     Patient's labs within parameters for treatment; 16 gauge needle used for phlebotomy via patient's Left AC; 500ml of whole blood removed, patient tolerated well. Gauze placed over site and pressure bandage applied. Vital signs stable throughout and prior to discharge, Patient tolerated treatment well and discharged without incident. Patient/parent is aware of next [address] appointment 03/04/2021.

## 2021-03-04 ENCOUNTER — HOSPITAL ENCOUNTER (OUTPATIENT)
Dept: INFUSION THERAPY | Age: 61
Discharge: HOME | End: 2021-03-04
Payer: COMMERCIAL

## 2021-03-04 VITALS
RESPIRATION RATE: 16 BRPM | HEART RATE: 93 BPM | DIASTOLIC BLOOD PRESSURE: 78 MMHG | OXYGEN SATURATION: 95 % | TEMPERATURE: 98.3 F | SYSTOLIC BLOOD PRESSURE: 119 MMHG

## 2021-03-04 LAB
FERRITIN SERPL-MCNC: 50 NG/ML (ref 8–252)
HCT VFR BLD AUTO: 42.6 % (ref 35–47)
HGB BLD-MCNC: 14.8 G/DL (ref 11.5–16)

## 2021-03-04 PROCEDURE — 36415 COLL VENOUS BLD VENIPUNCTURE: CPT

## 2021-03-04 PROCEDURE — 85018 HEMOGLOBIN: CPT

## 2021-03-04 PROCEDURE — 82728 ASSAY OF FERRITIN: CPT

## 2021-03-04 NOTE — PROGRESS NOTES
[facility]      4350 Patient arrives for Therapeutic Phlebotomy/lab without acute problems. Please see [facility] for complete assessment and education provided. Peripheral stick to right ac for lab draw; labs sent for processing. Vitals Signs:  Patient Vitals for the past 12 hrs:   Temp Pulse Resp BP SpO2   03/04/21 1409 98.3 °F (36.8 °C) 93 16 119/78 95 %     Labs:  Recent Results (from the past 12 hour(s))   HGB & HCT    Collection Time: 03/04/21  2:16 PM   Result Value Ref Range    HGB 14.8 11.5 - 16.0 g/dL    HCT 42.6 35.0 - 47.0 %   FERRITIN    Collection Time: 03/04/21  2:16 PM   Result Value Ref Range    Ferritin 50 8 - 252 NG/ML       Patient's labs not within parameters for treatment. Vital signs stable throughout and prior to discharge, Patient tolerated treatment well and discharged without incident. Patient/parent is aware of next [facility] appointment 04/29/2021.

## 2021-04-01 ENCOUNTER — APPOINTMENT (OUTPATIENT)
Dept: INFUSION THERAPY | Age: 61
End: 2021-04-01

## 2021-04-29 ENCOUNTER — HOSPITAL ENCOUNTER (OUTPATIENT)
Dept: INFUSION THERAPY | Age: 61
Discharge: HOME | End: 2021-04-29
Payer: COMMERCIAL

## 2021-04-29 VITALS
TEMPERATURE: 97.9 F | HEART RATE: 78 BPM | DIASTOLIC BLOOD PRESSURE: 78 MMHG | SYSTOLIC BLOOD PRESSURE: 118 MMHG | RESPIRATION RATE: 18 BRPM

## 2021-04-29 LAB
FERRITIN SERPL-MCNC: 77 NG/ML (ref 26–388)
HCT VFR BLD AUTO: 41.7 % (ref 35–47)
HGB BLD-MCNC: 14.6 G/DL (ref 11.5–16)

## 2021-04-29 PROCEDURE — 85014 HEMATOCRIT: CPT

## 2021-04-29 PROCEDURE — 36415 COLL VENOUS BLD VENIPUNCTURE: CPT

## 2021-04-29 PROCEDURE — 82728 ASSAY OF FERRITIN: CPT

## 2021-04-29 PROCEDURE — 99195 PHLEBOTOMY: CPT

## 2021-04-29 NOTE — PROGRESS NOTES
PEDI [facility] VISIT NOTE      4821 Patient arrives for Therapeutic Phlebotomy/lab without acute problems. Please see [facility] for complete assessment and education provided. Peripheral stick to right hand for lab draw; labs sent for processing. Vitals Signs:  Patient Vitals for the past 12 hrs:   Temp Pulse Resp BP   04/29/21 1555  78 18 118/78   04/29/21 1408 97.9 °F (36.6 °C) 83 18 122/82     Labs:  Recent Results (from the past 12 hour(s))   HGB & HCT    Collection Time: 04/29/21  2:14 PM   Result Value Ref Range    HGB 14.6 11.5 - 16.0 g/dL    HCT 41.7 35.0 - 47.0 %   FERRITIN    Collection Time: 04/29/21  2:14 PM   Result Value Ref Range    Ferritin 77 26 - 388 NG/ML     Patient's labs within parameters for treatment; 16 gauge needle used for phlebotomy via patient's Left AC; 500ml of whole blood removed, patient tolerated well. Gauze placed over site and pressure bandage applied. Vital signs stable throughout and prior to discharge, Patient tolerated treatment well and discharged without incident. Patient/parent is aware of next [facility] appointment 05/27/21.

## 2021-05-27 ENCOUNTER — HOSPITAL ENCOUNTER (OUTPATIENT)
Dept: INFUSION THERAPY | Age: 61
Discharge: HOME | End: 2021-05-27
Payer: COMMERCIAL

## 2021-05-27 VITALS
SYSTOLIC BLOOD PRESSURE: 102 MMHG | DIASTOLIC BLOOD PRESSURE: 71 MMHG | RESPIRATION RATE: 18 BRPM | TEMPERATURE: 98.2 F | HEART RATE: 83 BPM

## 2021-05-27 LAB
FERRITIN SERPL-MCNC: 67 NG/ML (ref 8–252)
HCT VFR BLD AUTO: 39.9 % (ref 35–47)
HGB BLD-MCNC: 14.1 G/DL (ref 11.5–16)

## 2021-05-27 PROCEDURE — 99195 PHLEBOTOMY: CPT

## 2021-05-27 PROCEDURE — 82728 ASSAY OF FERRITIN: CPT

## 2021-05-27 PROCEDURE — 85018 HEMOGLOBIN: CPT

## 2021-05-27 PROCEDURE — 36415 COLL VENOUS BLD VENIPUNCTURE: CPT

## 2021-05-27 NOTE — PROGRESS NOTES
OPIC Chemo Progress Note    Date: May 27, 2021    Name: Monserrat Lane    MRN: [id number]         : 1960    1400 Ms. Valladares Arrived to [facility] for  Labs and possible therapeutic phlebotomy ambulatory in stable condition. Assessment was completed, no acute issues at this time, no new complaints voiced. Labs drawn and sent for processing. Patient denies SOB, fever, cough, general not feeling well. Patient denies recent exposure to someone who has tested positive for COVID-19. Patient denies having contact with anyone who has a pending COVID test.      Ms. Valladares's vitals were reviewed. Patient Vitals for the past 12 hrs:   Temp Pulse Resp BP   21 1619  83  102/71   21 1405 98.2 °F (36.8 °C) 91 18 101/70         Lab results were obtained and reviewed. Recent Results (from the past 12 hour(s))   HGB & HCT    Collection Time: 21  2:02 PM   Result Value Ref Range    HGB 14.1 11.5 - 16.0 g/dL    HCT 39.9 35.0 - 47.0 %   FERRITIN    Collection Time: 21  2:02 PM   Result Value Ref Range    Ferritin 67 8 - 252 NG/ML       500 cc blood removed from right AC via 16 gauge needle without difficulty. Needle removed and blood discarded per policy. Pressure applied to site and site wrapped with coban. Patient declined the offer of a drink and snack. 1620 Patient tolerated treatment well. Patient was discharged from [facility] in stable condition.      Future Appointments   Date Time Provider Papa Huntley   2021  2:00 PM B4 [facility]   2021  2:00 PM B4 [facility]   2021  2:00 PM B4 [facility]         Grey Su RN  May 27, 2021

## 2021-06-24 ENCOUNTER — HOSPITAL ENCOUNTER (OUTPATIENT)
Dept: INFUSION THERAPY | Age: 61
Discharge: HOME | End: 2021-06-24
Payer: COMMERCIAL

## 2021-06-24 VITALS
TEMPERATURE: 98 F | SYSTOLIC BLOOD PRESSURE: 122 MMHG | RESPIRATION RATE: 18 BRPM | DIASTOLIC BLOOD PRESSURE: 74 MMHG | OXYGEN SATURATION: 98 % | HEART RATE: 89 BPM

## 2021-06-24 LAB
FERRITIN SERPL-MCNC: 48 NG/ML (ref 8–252)
HCT VFR BLD AUTO: 40.7 % (ref 35–47)
HGB BLD-MCNC: 14.3 G/DL (ref 11.5–16)

## 2021-06-24 PROCEDURE — 85018 HEMOGLOBIN: CPT

## 2021-06-24 PROCEDURE — 36415 COLL VENOUS BLD VENIPUNCTURE: CPT

## 2021-06-24 PROCEDURE — 82728 ASSAY OF FERRITIN: CPT

## 2021-06-24 NOTE — PROGRESS NOTES
PEDI [facility] VISIT NOTE      3771 Patient arrives for Therapeutic Phlebotomy/lab without acute problems. Please see [facility] for complete assessment and education provided. Peripheral stick to Right Hand for lab draw; labs sent for processing. Vitals Signs:  Patient Vitals for the past 12 hrs:   Temp Pulse Resp BP SpO2   06/24/21 1355 98 °F (36.7 °C) 89 18 122/74 98 %     Labs:  Recent Results (from the past 12 hour(s))   FERRITIN    Collection Time: 06/24/21  2:05 PM   Result Value Ref Range    Ferritin 48 8 - 252 NG/ML   HGB & HCT    Collection Time: 06/24/21  2:05 PM   Result Value Ref Range    HGB 14.3 11.5 - 16.0 g/dL    HCT 40.7 35.0 - 47.0 %      Patient's labs NOT within parameters for treatment. Treatment Held for Ferritin Level <50 as ordered. Vital signs stable throughout and prior to discharge, Patient tolerated treatment well and discharged without incident. Patient is aware of next [facility] appointment 07/22/2021.

## 2021-07-22 ENCOUNTER — APPOINTMENT (OUTPATIENT)
Dept: INFUSION THERAPY | Age: 61
End: 2021-07-22

## 2021-07-23 ENCOUNTER — HOSPITAL ENCOUNTER (OUTPATIENT)
Dept: INFUSION THERAPY | Age: 61
Discharge: HOME | End: 2021-07-23
Payer: COMMERCIAL

## 2021-07-23 VITALS
DIASTOLIC BLOOD PRESSURE: 85 MMHG | RESPIRATION RATE: 16 BRPM | OXYGEN SATURATION: 96 % | SYSTOLIC BLOOD PRESSURE: 144 MMHG | HEART RATE: 97 BPM | TEMPERATURE: 98.5 F

## 2021-07-23 LAB
FERRITIN SERPL-MCNC: 42 NG/ML (ref 8–252)
HCT VFR BLD AUTO: 43.4 % (ref 35–47)
HGB BLD-MCNC: 14.9 G/DL (ref 11.5–16)

## 2021-07-23 PROCEDURE — 82728 ASSAY OF FERRITIN: CPT

## 2021-07-23 PROCEDURE — 36415 COLL VENOUS BLD VENIPUNCTURE: CPT

## 2021-07-23 PROCEDURE — 85018 HEMOGLOBIN: CPT

## 2021-07-23 NOTE — PROGRESS NOTES
PEDI [facility] VISIT NOTE      5299 Patient arrives for Therapeutic Phlebotomy/lab without acute problems. Please see connect [facility] for complete assessment and education provided. Peripheral stick to right ac for lab draw; labs sent for processing. Patient is aware of next [facility] appointment on 8/19/2021. Vitals Signs:     Visit Vitals  BP (!) 144/85 (BP 1 Location: Left upper arm, BP Patient Position: Sitting)   Pulse 97   Temp 98.5 °F (36.9 °C)   Resp 16   SpO2 96%   Breastfeeding No          Labs: Labs pending, see [facility] for results. Recent Results (from the past 12 hour(s))   FERRITIN    Collection Time: 07/23/21  2:28 PM   Result Value Ref Range    Ferritin 42 8 - 252 NG/ML     **Lab called to say that machine that runs H&H was down and results would be delayed. After waiting for an hour, patient opted to go home with a good ferritin level. Nurse stated she would continue to monitor for the H&H results and will call patient if needed. Patient's resulted labs were not within parameters for treatment, so no phlebotomy was performed.

## 2021-08-19 ENCOUNTER — HOSPITAL ENCOUNTER (OUTPATIENT)
Dept: INFUSION THERAPY | Age: 61
Discharge: HOME | End: 2021-08-19
Payer: COMMERCIAL

## 2021-08-19 VITALS
TEMPERATURE: 97.8 F | SYSTOLIC BLOOD PRESSURE: 130 MMHG | OXYGEN SATURATION: 97 % | HEART RATE: 83 BPM | RESPIRATION RATE: 18 BRPM | DIASTOLIC BLOOD PRESSURE: 88 MMHG

## 2021-08-19 LAB
FERRITIN SERPL-MCNC: 43 NG/ML (ref 8–252)
HCT VFR BLD AUTO: 41.7 % (ref 35–47)
HGB BLD-MCNC: 14.7 G/DL (ref 11.5–16)

## 2021-08-19 PROCEDURE — 36415 COLL VENOUS BLD VENIPUNCTURE: CPT

## 2021-08-19 PROCEDURE — 85018 HEMOGLOBIN: CPT

## 2021-08-19 PROCEDURE — 82728 ASSAY OF FERRITIN: CPT

## 2021-08-19 NOTE — PROGRESS NOTES
PEDI [facility] VISIT NOTE      7858 Patient arrives for Therapeutic Phlebotomy/labs Q 4 Weeks without acute problems. Please see [facility] for complete assessment and education provided. Peripheral stick to right hand for lab draw; labs sent for processing. Vitals Signs:  Patient Vitals for the past 12 hrs:   Temp Pulse Resp BP SpO2   08/19/21 1507 97.8 °F (36.6 °C) 83 18 130/88 97 %     Labs:  Recent Results (from the past 12 hour(s))   HGB & HCT    Collection Time: 08/19/21  3:12 PM   Result Value Ref Range    HGB 14.7 11.5 - 16.0 g/dL    HCT 41.7 35.0 - 47.0 %   FERRITIN    Collection Time: 08/19/21  3:12 PM   Result Value Ref Range    Ferritin 43 8 - 252 NG/ML       Patient's Phlebotomy held due to lab not in parameters for treatment; patient's Ferritin = 43 with MD orders to hold Phlebotomy for Ferritin less than 50. Vital signs stable throughout and prior to discharge, Patient tolerated treatment well and discharged without incident. Patient/parent is aware of next [facility] appointment 09/16/2021.

## 2021-09-07 ENCOUNTER — HOSPITAL ENCOUNTER (EMERGENCY)
Age: 61
Discharge: HOME | End: 2021-09-07
Attending: EMERGENCY MEDICINE | Admitting: EMERGENCY MEDICINE
Payer: COMMERCIAL

## 2021-09-07 VITALS
DIASTOLIC BLOOD PRESSURE: 94 MMHG | RESPIRATION RATE: 18 BRPM | TEMPERATURE: 97.3 F | HEART RATE: 69 BPM | OXYGEN SATURATION: 97 % | SYSTOLIC BLOOD PRESSURE: 159 MMHG

## 2021-09-07 DIAGNOSIS — R00.0 TACHYCARDIA: ICD-10-CM

## 2021-09-07 DIAGNOSIS — R42 LIGHTHEADEDNESS: Primary | ICD-10-CM

## 2021-09-07 LAB
ALBUMIN SERPL-MCNC: 3.6 G/DL (ref 3.5–5)
ALBUMIN/GLOB SERPL: 1.1 {RATIO} (ref 1.1–2.2)
ALP SERPL-CCNC: 132 U/L (ref 45–117)
ALT SERPL-CCNC: 26 U/L (ref 12–78)
ANION GAP SERPL CALC-SCNC: 7 MMOL/L (ref 5–15)
AST SERPL-CCNC: 21 U/L (ref 15–37)
ATRIAL RATE: 85 BPM
BASOPHILS # BLD: 0 K/UL (ref 0–0.1)
BASOPHILS NFR BLD: 0 % (ref 0–1)
BILIRUB SERPL-MCNC: 0.7 MG/DL (ref 0.2–1)
BUN SERPL-MCNC: 13 MG/DL (ref 6–20)
BUN/CREAT SERPL: 20 (ref 12–20)
CALCIUM SERPL-MCNC: 9.2 MG/DL (ref 8.5–10.1)
CALCULATED P AXIS, ECG09: 54 DEGREES
CALCULATED R AXIS, ECG10: -42 DEGREES
CALCULATED T AXIS, ECG11: 31 DEGREES
CHLORIDE SERPL-SCNC: 107 MMOL/L (ref 97–108)
CO2 SERPL-SCNC: 25 MMOL/L (ref 21–32)
COMMENT, HOLDF: NORMAL
CREAT SERPL-MCNC: 0.65 MG/DL (ref 0.55–1.02)
DIAGNOSIS, 93000: NORMAL
DIFFERENTIAL METHOD BLD: ABNORMAL
EOSINOPHIL # BLD: 0.1 K/UL (ref 0–0.4)
EOSINOPHIL NFR BLD: 1 % (ref 0–7)
ERYTHROCYTE [DISTWIDTH] IN BLOOD BY AUTOMATED COUNT: 11.7 % (ref 11.5–14.5)
GLOBULIN SER CALC-MCNC: 3.4 G/DL (ref 2–4)
GLUCOSE SERPL-MCNC: 133 MG/DL (ref 65–100)
HCT VFR BLD AUTO: 42.7 % (ref 35–47)
HGB BLD-MCNC: 14.9 G/DL (ref 11.5–16)
IMM GRANULOCYTES # BLD AUTO: 0 K/UL (ref 0–0.04)
IMM GRANULOCYTES NFR BLD AUTO: 0 % (ref 0–0.5)
LYMPHOCYTES # BLD: 1.8 K/UL (ref 0.8–3.5)
LYMPHOCYTES NFR BLD: 24 % (ref 12–49)
MCH RBC QN AUTO: 36.3 PG (ref 26–34)
MCHC RBC AUTO-ENTMCNC: 34.9 G/DL (ref 30–36.5)
MCV RBC AUTO: 103.9 FL (ref 80–99)
MONOCYTES # BLD: 0.7 K/UL (ref 0–1)
MONOCYTES NFR BLD: 9 % (ref 5–13)
NEUTS SEG # BLD: 4.9 K/UL (ref 1.8–8)
NEUTS SEG NFR BLD: 66 % (ref 32–75)
NRBC # BLD: 0 K/UL (ref 0–0.01)
NRBC BLD-RTO: 0 PER 100 WBC
P-R INTERVAL, ECG05: 154 MS
PLATELET # BLD AUTO: 246 K/UL (ref 150–400)
PMV BLD AUTO: 9.3 FL (ref 8.9–12.9)
POTASSIUM SERPL-SCNC: 4.3 MMOL/L (ref 3.5–5.1)
PROT SERPL-MCNC: 7 G/DL (ref 6.4–8.2)
Q-T INTERVAL, ECG07: 376 MS
QRS DURATION, ECG06: 82 MS
QTC CALCULATION (BEZET), ECG08: 447 MS
RBC # BLD AUTO: 4.11 M/UL (ref 3.8–5.2)
SAMPLES BEING HELD,HOLD: NORMAL
SODIUM SERPL-SCNC: 139 MMOL/L (ref 136–145)
T4 FREE SERPL-MCNC: 1.1 NG/DL (ref 0.8–1.5)
TROPONIN I SERPL-MCNC: <0.05 NG/ML
TSH SERPL DL<=0.05 MIU/L-ACNC: 1.56 UIU/ML (ref 0.36–3.74)
VENTRICULAR RATE, ECG03: 85 BPM
WBC # BLD AUTO: 7.5 K/UL (ref 3.6–11)

## 2021-09-07 PROCEDURE — 85025 COMPLETE CBC W/AUTO DIFF WBC: CPT

## 2021-09-07 PROCEDURE — 99284 EMERGENCY DEPT VISIT MOD MDM: CPT

## 2021-09-07 PROCEDURE — 93005 ELECTROCARDIOGRAM TRACING: CPT

## 2021-09-07 PROCEDURE — 36415 COLL VENOUS BLD VENIPUNCTURE: CPT

## 2021-09-07 PROCEDURE — 84443 ASSAY THYROID STIM HORMONE: CPT

## 2021-09-07 PROCEDURE — 84484 ASSAY OF TROPONIN QUANT: CPT

## 2021-09-07 PROCEDURE — 84439 ASSAY OF FREE THYROXINE: CPT

## 2021-09-07 PROCEDURE — 80053 COMPREHEN METABOLIC PANEL: CPT

## 2021-09-07 NOTE — ED PROVIDER NOTES
HPI the patient complains of feeling lightheaded since yesterday. She wears a Fitbit and it showed that her heart rate was in the 150s at one point. She denies chest pain, shortness of breath, syncope or recent illness. History reviewed. No pertinent past medical history. History reviewed. No pertinent surgical history. History reviewed. No pertinent family history. Social History     Socioeconomic History    Marital status:      Spouse name: Not on file    Number of children: Not on file    Years of education: Not on file    Highest education level: Not on file   Occupational History    Not on file   Tobacco Use    Smoking status: Current Every Day Smoker     Packs/day: 0.25    Smokeless tobacco: Never Used   Substance and Sexual Activity    Alcohol use: Not on file    Drug use: Not on file    Sexual activity: Not on file   Other Topics Concern     Service Not Asked    Blood Transfusions Not Asked    Caffeine Concern Not Asked    Occupational Exposure Not Asked    Hobby Hazards Not Asked    Sleep Concern Not Asked    Stress Concern Not Asked    Weight Concern Not Asked    Special Diet Not Asked    Back Care Not Asked    Exercise Not Asked    Bike Helmet Not Asked   [address] Not Asked    Self-Exams Not Asked   Social History Narrative    Not on file     Social Determinants of Health     Financial Resource Strain:     Difficulty of Paying Living Expenses:    Food Insecurity:     Worried About Running Out of Food in the Last Year:     [address] in the Last Year:    Transportation Needs:     Lack of Transportation (Medical):      Lack of Transportation (Non-Medical):    Physical Activity:     Days of Exercise per Week:     Minutes of Exercise per Session:    Stress:     Feeling of Stress :    Social Connections:     Frequency of Communication with Friends and Family:     Frequency of Social Gatherings with Friends and Family:     Attends Yarsanism Services:     Active Member of Clubs or Organizations:     Attends Club or Organization Meetings:     Marital Status:    Intimate Partner Violence:     Fear of Current or Ex-Partner:     Emotionally Abused:     Physically Abused:     Sexually Abused: ALLERGIES: Patient has no known allergies. Review of Systems   Constitutional: Negative for fever. HENT: Negative for voice change. Eyes: Negative for visual disturbance. Respiratory: Negative for cough and shortness of breath. Cardiovascular: Negative for chest pain and palpitations. Gastrointestinal: Negative for abdominal pain, nausea and vomiting. Genitourinary: Negative for flank pain. Musculoskeletal: Negative for back pain. Skin: Negative for rash. Neurological: Positive for light-headedness. Negative for headaches. Psychiatric/Behavioral: Negative for confusion. Vitals:    09/07/21 1256   BP: 122/74   Pulse: 94   Resp: 18   Temp: 98 °F (36.7 °C)   SpO2: 96%            Physical Exam  Constitutional:       General: She is not in acute distress. Appearance: She is well-developed. HENT:      Head: Normocephalic. Cardiovascular:      Rate and Rhythm: Normal rate. Heart sounds: No murmur heard. Pulmonary:      Effort: Pulmonary effort is normal.      Breath sounds: Normal breath sounds. Abdominal:      Palpations: Abdomen is soft. Tenderness: There is no abdominal tenderness. Musculoskeletal:         General: Normal range of motion. Cervical back: Normal range of motion. Skin:     General: Skin is warm and dry. Capillary Refill: Capillary refill takes less than 2 seconds. Neurological:      Mental Status: She is alert. Psychiatric:         Behavior: Behavior normal.          MDM       Procedures ED EKG interpretation:  Rhythm: NSR, rate 85. No acute changes. This EKG was interpreted by Derik Burns MD,ED Provider.

## 2021-09-07 NOTE — ED TRIAGE NOTES
PT arrives from work d/t feeling lightheaded since yesterday, and noticing her fitbit was showing her HR was in the 150s. Reports the lightheadedness occurs intermittently. Denies history of cardiac issues including Afib. Denies SOB.

## 2021-09-16 ENCOUNTER — HOSPITAL ENCOUNTER (OUTPATIENT)
Dept: INFUSION THERAPY | Age: 61
End: 2021-09-16

## 2021-10-05 ENCOUNTER — HOSPITAL ENCOUNTER (OUTPATIENT)
Dept: INFUSION THERAPY | Age: 61
End: 2021-10-05

## 2021-10-13 ENCOUNTER — HOSPITAL ENCOUNTER (OUTPATIENT)
Dept: INFUSION THERAPY | Age: 61
Discharge: HOME | End: 2021-10-13
Payer: COMMERCIAL

## 2021-10-13 VITALS
SYSTOLIC BLOOD PRESSURE: 113 MMHG | RESPIRATION RATE: 16 BRPM | HEART RATE: 81 BPM | TEMPERATURE: 97.8 F | DIASTOLIC BLOOD PRESSURE: 78 MMHG

## 2021-10-13 LAB
BASOPHILS # BLD: 0 K/UL (ref 0–0.1)
BASOPHILS NFR BLD: 1 % (ref 0–1)
DIFFERENTIAL METHOD BLD: ABNORMAL
EOSINOPHIL # BLD: 0.1 K/UL (ref 0–0.4)
EOSINOPHIL NFR BLD: 3 % (ref 0–7)
ERYTHROCYTE [DISTWIDTH] IN BLOOD BY AUTOMATED COUNT: 12.2 % (ref 11.5–14.5)
FERRITIN SERPL-MCNC: 113 NG/ML (ref 8–252)
HCT VFR BLD AUTO: 41.4 % (ref 35–47)
HGB BLD-MCNC: 14.3 G/DL (ref 11.5–16)
IMM GRANULOCYTES # BLD AUTO: 0 K/UL (ref 0–0.04)
IMM GRANULOCYTES NFR BLD AUTO: 0 % (ref 0–0.5)
LYMPHOCYTES # BLD: 1.6 K/UL (ref 0.8–3.5)
LYMPHOCYTES NFR BLD: 34 % (ref 12–49)
MCH RBC QN AUTO: 35.9 PG (ref 26–34)
MCHC RBC AUTO-ENTMCNC: 34.5 G/DL (ref 30–36.5)
MCV RBC AUTO: 104 FL (ref 80–99)
MONOCYTES # BLD: 0.6 K/UL (ref 0–1)
MONOCYTES NFR BLD: 12 % (ref 5–13)
NEUTS SEG # BLD: 2.5 K/UL (ref 1.8–8)
NEUTS SEG NFR BLD: 50 % (ref 32–75)
NRBC # BLD: 0 K/UL (ref 0–0.01)
NRBC BLD-RTO: 0 PER 100 WBC
PLATELET # BLD AUTO: 259 K/UL (ref 150–400)
PMV BLD AUTO: 9.3 FL (ref 8.9–12.9)
RBC # BLD AUTO: 3.98 M/UL (ref 3.8–5.2)
WBC # BLD AUTO: 4.9 K/UL (ref 3.6–11)

## 2021-10-13 PROCEDURE — 36415 COLL VENOUS BLD VENIPUNCTURE: CPT

## 2021-10-13 PROCEDURE — 82728 ASSAY OF FERRITIN: CPT

## 2021-10-13 PROCEDURE — 85025 COMPLETE CBC W/AUTO DIFF WBC: CPT

## 2021-10-13 NOTE — PROGRESS NOTES
PEDI [facility] VISIT NOTE      1000 Patient arrives for Therapeutic Phlebotomy/lab without acute problems. Please see [facility] for complete assessment and education provided. Peripheral stick to right ac for lab draw; labs sent for processing. Vitals Signs:     Patient Vitals for the past 12 hrs:   Temp Pulse Resp BP   10/13/21 1005 97.8 °F (36.6 °C) 81 16 113/78       Labs:  Recent Results (from the past 12 hour(s))   CBC WITH AUTOMATED DIFF    Collection Time: 10/13/21 10:12 AM   Result Value Ref Range    WBC 4.9 3.6 - 11.0 K/uL    RBC 3.98 3.80 - 5.20 M/uL    HGB 14.3 11.5 - 16.0 g/dL    HCT 41.4 35.0 - 47.0 %    .0 (H) 80.0 - 99.0 FL    MCH 35.9 (H) 26.0 - 34.0 PG    MCHC 34.5 30.0 - 36.5 g/dL    RDW 12.2 11.5 - 14.5 %    PLATELET 205 810 - 190 K/uL    MPV 9.3 8.9 - 12.9 FL    NRBC 0.0 0  WBC    ABSOLUTE NRBC 0.00 0.00 - 0.01 K/uL    NEUTROPHILS 50 32 - 75 %    LYMPHOCYTES 34 12 - 49 %    MONOCYTES 12 5 - 13 %    EOSINOPHILS 3 0 - 7 %    BASOPHILS 1 0 - 1 %    IMMATURE GRANULOCYTES 0 0.0 - 0.5 %    ABS. NEUTROPHILS 2.5 1.8 - 8.0 K/UL    ABS. LYMPHOCYTES 1.6 0.8 - 3.5 K/UL    ABS. MONOCYTES 0.6 0.0 - 1.0 K/UL    ABS. EOSINOPHILS 0.1 0.0 - 0.4 K/UL    ABS. BASOPHILS 0.0 0.0 - 0.1 K/UL    ABS. IMM. GRANS. 0.0 0.00 - 0.04 K/UL    DF AUTOMATED     FERRITIN    Collection Time: 10/13/21 10:12 AM   Result Value Ref Range    Ferritin 113 8 - 252 NG/ML     Patient's labs within parameters for treatment; 16 gauge needle used for phlebotomy from L ac; 500ml of whole blood removed, patient tolerated well. Gauze placed over site and pressure bandage applied. Vital signs stable throughout and prior to discharge, Patient tolerated treatment well and discharged without incident. Patient/parent is aware of next [facility] appointment 11/11/2021.

## 2021-11-11 ENCOUNTER — HOSPITAL ENCOUNTER (OUTPATIENT)
Dept: INFUSION THERAPY | Age: 61
Discharge: HOME | End: 2021-11-11
Payer: COMMERCIAL

## 2021-11-11 VITALS
OXYGEN SATURATION: 95 % | RESPIRATION RATE: 16 BRPM | SYSTOLIC BLOOD PRESSURE: 117 MMHG | TEMPERATURE: 98.5 F | DIASTOLIC BLOOD PRESSURE: 77 MMHG | HEART RATE: 87 BPM

## 2021-11-11 LAB
BASOPHILS # BLD: 0 K/UL (ref 0–0.1)
BASOPHILS NFR BLD: 0 % (ref 0–1)
DIFFERENTIAL METHOD BLD: ABNORMAL
EOSINOPHIL # BLD: 0.1 K/UL (ref 0–0.4)
EOSINOPHIL NFR BLD: 2 % (ref 0–7)
ERYTHROCYTE [DISTWIDTH] IN BLOOD BY AUTOMATED COUNT: 12.8 % (ref 11.5–14.5)
FERRITIN SERPL-MCNC: 121 NG/ML (ref 26–388)
HCT VFR BLD AUTO: 40.5 % (ref 35–47)
HGB BLD-MCNC: 14.4 G/DL (ref 11.5–16)
IMM GRANULOCYTES # BLD AUTO: 0 K/UL (ref 0–0.04)
IMM GRANULOCYTES NFR BLD AUTO: 0 % (ref 0–0.5)
LYMPHOCYTES # BLD: 1.7 K/UL (ref 0.8–3.5)
LYMPHOCYTES NFR BLD: 26 % (ref 12–49)
MCH RBC QN AUTO: 37.4 PG (ref 26–34)
MCHC RBC AUTO-ENTMCNC: 35.6 G/DL (ref 30–36.5)
MCV RBC AUTO: 105.2 FL (ref 80–99)
MONOCYTES # BLD: 0.6 K/UL (ref 0–1)
MONOCYTES NFR BLD: 9 % (ref 5–13)
NEUTS SEG # BLD: 4.2 K/UL (ref 1.8–8)
NEUTS SEG NFR BLD: 63 % (ref 32–75)
NRBC # BLD: 0 K/UL (ref 0–0.01)
NRBC BLD-RTO: 0 PER 100 WBC
PLATELET # BLD AUTO: 248 K/UL (ref 150–400)
PMV BLD AUTO: 9.2 FL (ref 8.9–12.9)
RBC # BLD AUTO: 3.85 M/UL (ref 3.8–5.2)
WBC # BLD AUTO: 6.7 K/UL (ref 3.6–11)

## 2021-11-11 PROCEDURE — 82728 ASSAY OF FERRITIN: CPT

## 2021-11-11 PROCEDURE — 85025 COMPLETE CBC W/AUTO DIFF WBC: CPT

## 2021-11-11 PROCEDURE — 99195 PHLEBOTOMY: CPT

## 2021-11-11 PROCEDURE — 36415 COLL VENOUS BLD VENIPUNCTURE: CPT

## 2021-11-11 NOTE — PROGRESS NOTES
January 11, 2019    Sam Pete  [address] [facility] [address]             [facility]  [address]  [place] [address]  Phone: [phone] Dear [facility] is committed to your overall health and would like to ensure that you are up to date on your recommended test and/or procedures.   Ty Montalvo MD  has found that your chart shows you may be due for the following:     COLORECTAL CANCER SCREENING       If you have had any of the above done at another facility, please let us know so that we may obtain copies from that facility.  If you have a copy of these records, please provide a copy for us to scan into your chart.  You are welcome to request that the report be faxed to us at  ([phone]).     Otherwise, please schedule these appointments at your earliest convenience by calling [phone] or going to [email or web address].     If you have an upcoming scheduled appointment, please disregard this letter.     Sincerely,   Your [facility]   MD Chiquis Aguilar LPN Clinical Care Coordinator   [facility]   [address] [facility] [address]   Phone [phone]   Fax [phone]         Patient tolerated treatment without complication or complaint.

## 2021-11-11 NOTE — PROGRESS NOTES
PEDI formerly [facility] VISIT NOTE      1400 Patient arrives for Therapeutic Phlebotomy/lab without acute problems. Please see [facility] for complete assessment and education provided. Peripheral stick to right ac for lab draw; labs sent for processing. Vitals Signs:     Patient Vitals for the past 12 hrs:   Temp Pulse Resp BP SpO2   11/11/21 1400 98.5 °F (36.9 °C) 87 16 117/77 95 %        Labs:  Recent Results (from the past 12 hour(s))   CBC WITH AUTOMATED DIFF    Collection Time: 11/11/21  2:03 PM   Result Value Ref Range    WBC 6.7 3.6 - 11.0 K/uL    RBC 3.85 3.80 - 5.20 M/uL    HGB 14.4 11.5 - 16.0 g/dL    HCT 40.5 35.0 - 47.0 %    .2 (H) 80.0 - 99.0 FL    MCH 37.4 (H) 26.0 - 34.0 PG    MCHC 35.6 30.0 - 36.5 g/dL    RDW 12.8 11.5 - 14.5 %    PLATELET 704 732 - 623 K/uL    MPV 9.2 8.9 - 12.9 FL    NRBC 0.0 0  WBC    ABSOLUTE NRBC 0.00 0.00 - 0.01 K/uL    NEUTROPHILS 63 32 - 75 %    LYMPHOCYTES 26 12 - 49 %    MONOCYTES 9 5 - 13 %    EOSINOPHILS 2 0 - 7 %    BASOPHILS 0 0 - 1 %    IMMATURE GRANULOCYTES 0 0.0 - 0.5 %    ABS. NEUTROPHILS 4.2 1.8 - 8.0 K/UL    ABS. LYMPHOCYTES 1.7 0.8 - 3.5 K/UL    ABS. MONOCYTES 0.6 0.0 - 1.0 K/UL    ABS. EOSINOPHILS 0.1 0.0 - 0.4 K/UL    ABS. BASOPHILS 0.0 0.0 - 0.1 K/UL    ABS. IMM. GRANS. 0.0 0.00 - 0.04 K/UL    DF AUTOMATED     FERRITIN    Collection Time: 11/11/21  2:03 PM   Result Value Ref Range    Ferritin 121 26 - 388 NG/ML       Patient's labs within parameters for treatment; 16 gauge needle used for phlebotomy, from L-AC; 500ml of whole blood removed, patient tolerated well. Gauze placed over site and pressure bandage applied. Vital signs stable throughout and prior to discharge, Patient tolerated treatment well and discharged without incident. Patient/parent is aware of next [facility] appointment 12/8/2021.

## 2021-12-08 ENCOUNTER — HOSPITAL ENCOUNTER (OUTPATIENT)
Dept: INFUSION THERAPY | Age: 61
Discharge: HOME | End: 2021-12-08
Payer: COMMERCIAL

## 2021-12-08 VITALS
DIASTOLIC BLOOD PRESSURE: 80 MMHG | RESPIRATION RATE: 18 BRPM | TEMPERATURE: 97.8 F | HEART RATE: 66 BPM | SYSTOLIC BLOOD PRESSURE: 146 MMHG | OXYGEN SATURATION: 97 %

## 2021-12-08 LAB
BASOPHILS # BLD: 0.1 K/UL (ref 0–0.1)
BASOPHILS NFR BLD: 1 % (ref 0–1)
DIFFERENTIAL METHOD BLD: ABNORMAL
EOSINOPHIL # BLD: 0.1 K/UL (ref 0–0.4)
EOSINOPHIL NFR BLD: 1 % (ref 0–7)
ERYTHROCYTE [DISTWIDTH] IN BLOOD BY AUTOMATED COUNT: 12.4 % (ref 11.5–14.5)
FERRITIN SERPL-MCNC: 59 NG/ML (ref 8–252)
HCT VFR BLD AUTO: 43.2 % (ref 35–47)
HGB BLD-MCNC: 15.1 G/DL (ref 11.5–16)
IMM GRANULOCYTES # BLD AUTO: 0 K/UL (ref 0–0.04)
IMM GRANULOCYTES NFR BLD AUTO: 0 % (ref 0–0.5)
LYMPHOCYTES # BLD: 2 K/UL (ref 0.8–3.5)
LYMPHOCYTES NFR BLD: 29 % (ref 12–49)
MCH RBC QN AUTO: 37.4 PG (ref 26–34)
MCHC RBC AUTO-ENTMCNC: 35 G/DL (ref 30–36.5)
MCV RBC AUTO: 106.9 FL (ref 80–99)
MONOCYTES # BLD: 1 K/UL (ref 0–1)
MONOCYTES NFR BLD: 15 % (ref 5–13)
NEUTS SEG # BLD: 3.6 K/UL (ref 1.8–8)
NEUTS SEG NFR BLD: 54 % (ref 32–75)
NRBC # BLD: 0 K/UL (ref 0–0.01)
NRBC BLD-RTO: 0 PER 100 WBC
PLATELET # BLD AUTO: 247 K/UL (ref 150–400)
PMV BLD AUTO: 9.1 FL (ref 8.9–12.9)
RBC # BLD AUTO: 4.04 M/UL (ref 3.8–5.2)
WBC # BLD AUTO: 6.7 K/UL (ref 3.6–11)

## 2021-12-08 PROCEDURE — 85025 COMPLETE CBC W/AUTO DIFF WBC: CPT

## 2021-12-08 PROCEDURE — 99195 PHLEBOTOMY: CPT

## 2021-12-08 PROCEDURE — 36415 COLL VENOUS BLD VENIPUNCTURE: CPT

## 2021-12-08 PROCEDURE — 82728 ASSAY OF FERRITIN: CPT

## 2021-12-08 NOTE — PROGRESS NOTES
[facility] Progress Note    Date: 2021    Name: Ratna Bruner    MRN: [id number]         : 1960    Ms. Valladares Arrived ambulatory and in no distress for Therapeutic Phlebotomy. Assessment was completed, no acute issues at this time, no new complaints voiced. Labs drawn from [facility] without difficulty. Ms. Luba De La Rosa vitals were reviewed. Patient Vitals for the past 12 hrs:   Temp Pulse Resp BP SpO2   21 1612  66 18 (!) 146/80    21 1455 97.8 °F (36.6 °C) 82 18 (!) 153/78 97 %       Lab results were obtained and reviewed. Recent Results (from the past 12 hour(s))   CBC WITH AUTOMATED DIFF    Collection Time: 21  2:57 PM   Result Value Ref Range    WBC 6.7 3.6 - 11.0 K/uL    RBC 4.04 3.80 - 5.20 M/uL    HGB 15.1 11.5 - 16.0 g/dL    HCT 43.2 35.0 - 47.0 %    .9 (H) 80.0 - 99.0 FL    MCH 37.4 (H) 26.0 - 34.0 PG    MCHC 35.0 30.0 - 36.5 g/dL    RDW 12.4 11.5 - 14.5 %    PLATELET 210 492 - 235 K/uL    MPV 9.1 8.9 - 12.9 FL    NRBC 0.0 0  WBC    ABSOLUTE NRBC 0.00 0.00 - 0.01 K/uL    NEUTROPHILS 54 32 - 75 %    LYMPHOCYTES 29 12 - 49 %    MONOCYTES 15 (H) 5 - 13 %    EOSINOPHILS 1 0 - 7 %    BASOPHILS 1 0 - 1 %    IMMATURE GRANULOCYTES 0 0.0 - 0.5 %    ABS. NEUTROPHILS 3.6 1.8 - 8.0 K/UL    ABS. LYMPHOCYTES 2.0 0.8 - 3.5 K/UL    ABS. MONOCYTES 1.0 0.0 - 1.0 K/UL    ABS. EOSINOPHILS 0.1 0.0 - 0.4 K/UL    ABS. BASOPHILS 0.1 0.0 - 0.1 K/UL    ABS. IMM. GRANS. 0.0 0.00 - 0.04 K/UL    DF AUTOMATED     FERRITIN    Collection Time: 21  2:57 PM   Result Value Ref Range    Ferritin 59 8 - 252 NG/ML       Left AC vein accessed using 16 g therapeutic phlebotomy set. 500 ml whole blood removed without difficulty, pt tolerated well. Manual pressure applied until hemostasis noted; wrapped with coban. Nourishment provided. Ms. Scarlett Olivares tolerated treatment well and was discharged from [place] [address] in stable condition at 1610.     She is to return on  at 1400 for her next appointment.     Sabiha Aguirre RN  December 8, 2021

## 2022-01-05 ENCOUNTER — HOSPITAL ENCOUNTER (OUTPATIENT)
Dept: INFUSION THERAPY | Age: 62
End: 2022-01-05

## 2022-01-06 ENCOUNTER — HOSPITAL ENCOUNTER (OUTPATIENT)
Dept: INFUSION THERAPY | Age: 62
Discharge: HOME | End: 2022-01-06
Payer: COMMERCIAL

## 2022-01-06 ENCOUNTER — APPOINTMENT (OUTPATIENT)
Dept: INFUSION THERAPY | Age: 62
End: 2022-01-06
Payer: COMMERCIAL

## 2022-01-06 VITALS
OXYGEN SATURATION: 97 % | DIASTOLIC BLOOD PRESSURE: 81 MMHG | HEART RATE: 88 BPM | RESPIRATION RATE: 18 BRPM | SYSTOLIC BLOOD PRESSURE: 124 MMHG | TEMPERATURE: 97.9 F

## 2022-01-06 LAB
BASOPHILS # BLD: 0 K/UL (ref 0–0.1)
BASOPHILS NFR BLD: 0 % (ref 0–1)
DIFFERENTIAL METHOD BLD: ABNORMAL
EOSINOPHIL # BLD: 0.2 K/UL (ref 0–0.4)
EOSINOPHIL NFR BLD: 2 % (ref 0–7)
ERYTHROCYTE [DISTWIDTH] IN BLOOD BY AUTOMATED COUNT: 11.7 % (ref 11.5–14.5)
FERRITIN SERPL-MCNC: 28 NG/ML (ref 8–252)
HCT VFR BLD AUTO: 43 % (ref 35–47)
HGB BLD-MCNC: 14.9 G/DL (ref 11.5–16)
IMM GRANULOCYTES # BLD AUTO: 0 K/UL (ref 0–0.04)
IMM GRANULOCYTES NFR BLD AUTO: 0 % (ref 0–0.5)
LYMPHOCYTES # BLD: 2 K/UL (ref 0.8–3.5)
LYMPHOCYTES NFR BLD: 29 % (ref 12–49)
MCH RBC QN AUTO: 36.7 PG (ref 26–34)
MCHC RBC AUTO-ENTMCNC: 34.7 G/DL (ref 30–36.5)
MCV RBC AUTO: 105.9 FL (ref 80–99)
MONOCYTES # BLD: 1 K/UL (ref 0–1)
MONOCYTES NFR BLD: 14 % (ref 5–13)
NEUTS SEG # BLD: 3.7 K/UL (ref 1.8–8)
NEUTS SEG NFR BLD: 55 % (ref 32–75)
NRBC # BLD: 0 K/UL (ref 0–0.01)
NRBC BLD-RTO: 0 PER 100 WBC
PLATELET # BLD AUTO: 243 K/UL (ref 150–400)
PMV BLD AUTO: 9.2 FL (ref 8.9–12.9)
RBC # BLD AUTO: 4.06 M/UL (ref 3.8–5.2)
WBC # BLD AUTO: 6.8 K/UL (ref 3.6–11)

## 2022-01-06 PROCEDURE — 82728 ASSAY OF FERRITIN: CPT

## 2022-01-06 PROCEDURE — 85025 COMPLETE CBC W/AUTO DIFF WBC: CPT

## 2022-01-06 PROCEDURE — 36415 COLL VENOUS BLD VENIPUNCTURE: CPT

## 2022-01-06 NOTE — PROGRESS NOTES
PEDI [facility] VISIT NOTE      7802 Patient arrives for Therapeutic Phlebotomy/lab without acute problems. Please see [facility] for complete assessment and education provided. Peripheral stick to right hand for lab draw; labs sent for processing. Vitals Signs:  Patient Vitals for the past 12 hrs:   Temp Pulse Resp BP SpO2   01/06/22 1506 97.9 °F (36.6 °C) 88 18 124/81 97 %     Labs:  Recent Results (from the past 12 hour(s))   CBC WITH AUTOMATED DIFF    Collection Time: 01/06/22  3:13 PM   Result Value Ref Range    WBC 6.8 3.6 - 11.0 K/uL    RBC 4.06 3.80 - 5.20 M/uL    HGB 14.9 11.5 - 16.0 g/dL    HCT 43.0 35.0 - 47.0 %    .9 (H) 80.0 - 99.0 FL    MCH 36.7 (H) 26.0 - 34.0 PG    MCHC 34.7 30.0 - 36.5 g/dL    RDW 11.7 11.5 - 14.5 %    PLATELET 790 846 - 912 K/uL    MPV 9.2 8.9 - 12.9 FL    NRBC 0.0 0  WBC    ABSOLUTE NRBC 0.00 0.00 - 0.01 K/uL    NEUTROPHILS 55 32 - 75 %    LYMPHOCYTES 29 12 - 49 %    MONOCYTES 14 (H) 5 - 13 %    EOSINOPHILS 2 0 - 7 %    BASOPHILS 0 0 - 1 %    IMMATURE GRANULOCYTES 0 0.0 - 0.5 %    ABS. NEUTROPHILS 3.7 1.8 - 8.0 K/UL    ABS. LYMPHOCYTES 2.0 0.8 - 3.5 K/UL    ABS. MONOCYTES 1.0 0.0 - 1.0 K/UL    ABS. EOSINOPHILS 0.2 0.0 - 0.4 K/UL    ABS. BASOPHILS 0.0 0.0 - 0.1 K/UL    ABS. IMM. GRANS. 0.0 0.00 - 0.04 K/UL    DF AUTOMATED     FERRITIN    Collection Time: 01/06/22  3:13 PM   Result Value Ref Range    Ferritin 28 8 - 252 NG/ML           Patient's Phlebotomy held due to lab not in parameters for treatment; patient's Ferritin = 28 with MD orders to hold Phlebotomy for Ferritin less than 50. Vital signs stable throughout and prior to discharge, Patient tolerated treatment well and discharged without incident. Patient/parent is aware of next [facility] appointment 02/02/2022.

## 2022-02-02 ENCOUNTER — HOSPITAL ENCOUNTER (OUTPATIENT)
Dept: INFUSION THERAPY | Age: 62
Discharge: HOME | End: 2022-02-02
Payer: COMMERCIAL

## 2022-02-02 VITALS
SYSTOLIC BLOOD PRESSURE: 108 MMHG | HEART RATE: 93 BPM | RESPIRATION RATE: 16 BRPM | TEMPERATURE: 97.8 F | OXYGEN SATURATION: 98 % | DIASTOLIC BLOOD PRESSURE: 79 MMHG

## 2022-02-02 LAB
BASOPHILS # BLD: 0 K/UL (ref 0–0.1)
BASOPHILS NFR BLD: 0 % (ref 0–1)
DIFFERENTIAL METHOD BLD: ABNORMAL
EOSINOPHIL # BLD: 0.1 K/UL (ref 0–0.4)
EOSINOPHIL NFR BLD: 2 % (ref 0–7)
ERYTHROCYTE [DISTWIDTH] IN BLOOD BY AUTOMATED COUNT: 11.8 % (ref 11.5–14.5)
FERRITIN SERPL-MCNC: 60 NG/ML (ref 8–252)
HCT VFR BLD AUTO: 43.3 % (ref 35–47)
HGB BLD-MCNC: 15.4 G/DL (ref 11.5–16)
IMM GRANULOCYTES # BLD AUTO: 0 K/UL (ref 0–0.04)
IMM GRANULOCYTES NFR BLD AUTO: 0 % (ref 0–0.5)
LYMPHOCYTES # BLD: 1.9 K/UL (ref 0.8–3.5)
LYMPHOCYTES NFR BLD: 30 % (ref 12–49)
MCH RBC QN AUTO: 36.8 PG (ref 26–34)
MCHC RBC AUTO-ENTMCNC: 35.6 G/DL (ref 30–36.5)
MCV RBC AUTO: 103.3 FL (ref 80–99)
MONOCYTES # BLD: 0.8 K/UL (ref 0–1)
MONOCYTES NFR BLD: 13 % (ref 5–13)
NEUTS SEG # BLD: 3.4 K/UL (ref 1.8–8)
NEUTS SEG NFR BLD: 55 % (ref 32–75)
NRBC # BLD: 0 K/UL (ref 0–0.01)
NRBC BLD-RTO: 0 PER 100 WBC
PLATELET # BLD AUTO: 223 K/UL (ref 150–400)
PMV BLD AUTO: 9.4 FL (ref 8.9–12.9)
RBC # BLD AUTO: 4.19 M/UL (ref 3.8–5.2)
WBC # BLD AUTO: 6.3 K/UL (ref 3.6–11)

## 2022-02-02 PROCEDURE — 85025 COMPLETE CBC W/AUTO DIFF WBC: CPT

## 2022-02-02 PROCEDURE — 82728 ASSAY OF FERRITIN: CPT

## 2022-02-02 PROCEDURE — 36415 COLL VENOUS BLD VENIPUNCTURE: CPT

## 2022-02-02 NOTE — PROGRESS NOTES
[facility]      1400 Patient arrives for Therapeutic Phlebotomy/lab without acute problems. Please see [facility] for complete assessment and education provided. Peripheral stick to right ac for lab draw; labs sent for processing. Vitals Signs:     Visit Vitals  /79 (BP 1 Location: Left upper arm, BP Patient Position: Sitting)   Pulse 93   Temp 97.8 °F (36.6 °C)   Resp 16   SpO2 98%   Breastfeeding No        Labs:    Recent Results (from the past 12 hour(s))   CBC WITH AUTOMATED DIFF    Collection Time: 02/02/22  3:10 PM   Result Value Ref Range    WBC 6.3 3.6 - 11.0 K/uL    RBC 4.19 3.80 - 5.20 M/uL    HGB 15.4 11.5 - 16.0 g/dL    HCT 43.3 35.0 - 47.0 %    .3 (H) 80.0 - 99.0 FL    MCH 36.8 (H) 26.0 - 34.0 PG    MCHC 35.6 30.0 - 36.5 g/dL    RDW 11.8 11.5 - 14.5 %    PLATELET 276 487 - 183 K/uL    MPV 9.4 8.9 - 12.9 FL    NRBC 0.0 0  WBC    ABSOLUTE NRBC 0.00 0.00 - 0.01 K/uL    NEUTROPHILS 55 32 - 75 %    LYMPHOCYTES 30 12 - 49 %    MONOCYTES 13 5 - 13 %    EOSINOPHILS 2 0 - 7 %    BASOPHILS 0 0 - 1 %    IMMATURE GRANULOCYTES 0 0.0 - 0.5 %    ABS. NEUTROPHILS 3.4 1.8 - 8.0 K/UL    ABS. LYMPHOCYTES 1.9 0.8 - 3.5 K/UL    ABS. MONOCYTES 0.8 0.0 - 1.0 K/UL    ABS. EOSINOPHILS 0.1 0.0 - 0.4 K/UL    ABS. BASOPHILS 0.0 0.0 - 0.1 K/UL    ABS. IMM. GRANS. 0.0 0.00 - 0.04 K/UL    DF AUTOMATED     FERRITIN    Collection Time: 02/02/22  3:10 PM   Result Value Ref Range    Ferritin 60 8 - 252 NG/ML         Patient's labs within parameters for treatment; 16 gauge needle used for phlebotomy from L-AC; 500ml of whole blood removed, patient tolerated well. Gauze placed over site and pressure bandage applied. Vital signs stable throughout and prior to discharge, Patient tolerated treatment well and discharged without incident. Patient/parent is aware of next [facility] appointment 3/2/2022.

## 2022-03-02 ENCOUNTER — HOSPITAL ENCOUNTER (OUTPATIENT)
Dept: INFUSION THERAPY | Age: 62
Discharge: HOME | End: 2022-03-02
Payer: COMMERCIAL

## 2022-03-02 LAB
BASOPHILS # BLD: 0 K/UL (ref 0–0.1)
BASOPHILS NFR BLD: 0 % (ref 0–1)
DIFFERENTIAL METHOD BLD: ABNORMAL
EOSINOPHIL # BLD: 0.1 K/UL (ref 0–0.4)
EOSINOPHIL NFR BLD: 2 % (ref 0–7)
ERYTHROCYTE [DISTWIDTH] IN BLOOD BY AUTOMATED COUNT: 12.2 % (ref 11.5–14.5)
FERRITIN SERPL-MCNC: 32 NG/ML (ref 8–252)
HCT VFR BLD AUTO: 43 % (ref 35–47)
HGB BLD-MCNC: 14.8 G/DL (ref 11.5–16)
IMM GRANULOCYTES # BLD AUTO: 0 K/UL (ref 0–0.04)
IMM GRANULOCYTES NFR BLD AUTO: 0 % (ref 0–0.5)
LYMPHOCYTES # BLD: 1.7 K/UL (ref 0.8–3.5)
LYMPHOCYTES NFR BLD: 25 % (ref 12–49)
MCH RBC QN AUTO: 35.7 PG (ref 26–34)
MCHC RBC AUTO-ENTMCNC: 34.4 G/DL (ref 30–36.5)
MCV RBC AUTO: 103.9 FL (ref 80–99)
MONOCYTES # BLD: 1 K/UL (ref 0–1)
MONOCYTES NFR BLD: 15 % (ref 5–13)
NEUTS SEG # BLD: 4 K/UL (ref 1.8–8)
NEUTS SEG NFR BLD: 58 % (ref 32–75)
NRBC # BLD: 0 K/UL (ref 0–0.01)
NRBC BLD-RTO: 0 PER 100 WBC
PLATELET # BLD AUTO: 254 K/UL (ref 150–400)
PMV BLD AUTO: 9.4 FL (ref 8.9–12.9)
RBC # BLD AUTO: 4.14 M/UL (ref 3.8–5.2)
WBC # BLD AUTO: 6.9 K/UL (ref 3.6–11)

## 2022-03-02 PROCEDURE — 36415 COLL VENOUS BLD VENIPUNCTURE: CPT

## 2022-03-02 PROCEDURE — 85025 COMPLETE CBC W/AUTO DIFF WBC: CPT

## 2022-03-02 PROCEDURE — 82728 ASSAY OF FERRITIN: CPT

## 2022-03-02 NOTE — PROGRESS NOTES
PEDI [facility] VISIT NOTE      5723 Patient arrives for Therapeutic Phlebotomy/lab without acute problems. Please see [facility] for complete assessment and education provided. Peripheral stick to right hand for lab draw; labs sent for processing. Vitals Signs:  Patient Vitals for the past 12 hrs:   Temp Pulse Resp BP   03/02/22 1458 (P) 97.6 °F (36.4 °C) (P) 85 (P) 18 (P) 113/77      Labs:  Recent Results (from the past 12 hour(s))   CBC WITH AUTOMATED DIFF    Collection Time: 03/02/22  3:06 PM   Result Value Ref Range    WBC 6.9 3.6 - 11.0 K/uL    RBC 4.14 3.80 - 5.20 M/uL    HGB 14.8 11.5 - 16.0 g/dL    HCT 43.0 35.0 - 47.0 %    .9 (H) 80.0 - 99.0 FL    MCH 35.7 (H) 26.0 - 34.0 PG    MCHC 34.4 30.0 - 36.5 g/dL    RDW 12.2 11.5 - 14.5 %    PLATELET 768 999 - 117 K/uL    MPV 9.4 8.9 - 12.9 FL    NRBC 0.0 0  WBC    ABSOLUTE NRBC 0.00 0.00 - 0.01 K/uL    NEUTROPHILS 58 32 - 75 %    LYMPHOCYTES 25 12 - 49 %    MONOCYTES 15 (H) 5 - 13 %    EOSINOPHILS 2 0 - 7 %    BASOPHILS 0 0 - 1 %    IMMATURE GRANULOCYTES 0 0.0 - 0.5 %    ABS. NEUTROPHILS 4.0 1.8 - 8.0 K/UL    ABS. LYMPHOCYTES 1.7 0.8 - 3.5 K/UL    ABS. MONOCYTES 1.0 0.0 - 1.0 K/UL    ABS. EOSINOPHILS 0.1 0.0 - 0.4 K/UL    ABS. BASOPHILS 0.0 0.0 - 0.1 K/UL    ABS. IMM. GRANS. 0.0 0.00 - 0.04 K/UL    DF AUTOMATED     FERRITIN    Collection Time: 03/02/22  3:06 PM   Result Value Ref Range    Ferritin 32 8 - 252 NG/ML     Patient's Phlebotomy held due to lab not in parameters for treatment; patient's Ferritin = 32 with MD orders to hold Phlebotomy for Ferritin less than 50. Vital signs stable throughout and prior to discharge, Patient tolerated treatment well and discharged without incident. Patient is aware of next [facility] appointment 03/30/2022.

## 2022-03-19 PROBLEM — E83.119 HEMOCHROMATOSIS: Status: ACTIVE | Noted: 2017-06-02

## 2022-03-30 ENCOUNTER — HOSPITAL ENCOUNTER (OUTPATIENT)
Dept: INFUSION THERAPY | Age: 62
Discharge: HOME | End: 2022-03-30
Payer: COMMERCIAL

## 2022-03-30 VITALS
SYSTOLIC BLOOD PRESSURE: 122 MMHG | DIASTOLIC BLOOD PRESSURE: 78 MMHG | HEART RATE: 90 BPM | RESPIRATION RATE: 16 BRPM | TEMPERATURE: 98 F

## 2022-03-30 LAB
BASOPHILS # BLD: 0 K/UL (ref 0–0.1)
BASOPHILS NFR BLD: 1 % (ref 0–1)
DIFFERENTIAL METHOD BLD: ABNORMAL
EOSINOPHIL # BLD: 0.1 K/UL (ref 0–0.4)
EOSINOPHIL NFR BLD: 2 % (ref 0–7)
ERYTHROCYTE [DISTWIDTH] IN BLOOD BY AUTOMATED COUNT: 12.8 % (ref 11.5–14.5)
FERRITIN SERPL-MCNC: 45 NG/ML (ref 8–252)
HCT VFR BLD AUTO: 44 % (ref 35–47)
HGB BLD-MCNC: 15.2 G/DL (ref 11.5–16)
IMM GRANULOCYTES # BLD AUTO: 0 K/UL (ref 0–0.04)
IMM GRANULOCYTES NFR BLD AUTO: 0 % (ref 0–0.5)
LYMPHOCYTES # BLD: 2 K/UL (ref 0.8–3.5)
LYMPHOCYTES NFR BLD: 31 % (ref 12–49)
MCH RBC QN AUTO: 35.5 PG (ref 26–34)
MCHC RBC AUTO-ENTMCNC: 34.5 G/DL (ref 30–36.5)
MCV RBC AUTO: 102.8 FL (ref 80–99)
MONOCYTES # BLD: 0.8 K/UL (ref 0–1)
MONOCYTES NFR BLD: 12 % (ref 5–13)
NEUTS SEG # BLD: 3.6 K/UL (ref 1.8–8)
NEUTS SEG NFR BLD: 54 % (ref 32–75)
NRBC # BLD: 0 K/UL (ref 0–0.01)
NRBC BLD-RTO: 0 PER 100 WBC
PLATELET # BLD AUTO: 251 K/UL (ref 150–400)
PMV BLD AUTO: 9.5 FL (ref 8.9–12.9)
RBC # BLD AUTO: 4.28 M/UL (ref 3.8–5.2)
WBC # BLD AUTO: 6.6 K/UL (ref 3.6–11)

## 2022-03-30 PROCEDURE — 82728 ASSAY OF FERRITIN: CPT

## 2022-03-30 PROCEDURE — 36415 COLL VENOUS BLD VENIPUNCTURE: CPT

## 2022-03-30 PROCEDURE — 85025 COMPLETE CBC W/AUTO DIFF WBC: CPT

## 2022-03-30 NOTE — PROGRESS NOTES
[address] [facility] @ [facility] Visit Note    1500 Patient arrives for therapeutic phlebotomy without acute problems. Please see [facility] for complete assessment and education provided. Prior to treatment, patient was screened for COVID 19. Patient denies any signs or symptoms of COVID. Denies any known physical contact with anyone diagnosed with, or with pending or positive COVID test. Denies a pending or positive COVID test himself. Vital signs:   Patient Vitals for the past 12 hrs:   Temp Pulse Resp BP   03/30/22 1501 98 °F (36.7 °C) 90 16 122/78     Labs obtained from [facility] and sent for processing. Lab work:   Recent Results (from the past 12 hour(s))   CBC WITH AUTOMATED DIFF    Collection Time: 03/30/22  3:08 PM   Result Value Ref Range    WBC 6.6 3.6 - 11.0 K/uL    RBC 4.28 3.80 - 5.20 M/uL    HGB 15.2 11.5 - 16.0 g/dL    HCT 44.0 35.0 - 47.0 %    .8 (H) 80.0 - 99.0 FL    MCH 35.5 (H) 26.0 - 34.0 PG    MCHC 34.5 30.0 - 36.5 g/dL    RDW 12.8 11.5 - 14.5 %    PLATELET 991 774 - 442 K/uL    MPV 9.5 8.9 - 12.9 FL    NRBC 0.0 0  WBC    ABSOLUTE NRBC 0.00 0.00 - 0.01 K/uL    NEUTROPHILS 54 32 - 75 %    LYMPHOCYTES 31 12 - 49 %    MONOCYTES 12 5 - 13 %    EOSINOPHILS 2 0 - 7 %    BASOPHILS 1 0 - 1 %    IMMATURE GRANULOCYTES 0 0.0 - 0.5 %    ABS. NEUTROPHILS 3.6 1.8 - 8.0 K/UL    ABS. LYMPHOCYTES 2.0 0.8 - 3.5 K/UL    ABS. MONOCYTES 0.8 0.0 - 1.0 K/UL    ABS. EOSINOPHILS 0.1 0.0 - 0.4 K/UL    ABS. BASOPHILS 0.0 0.0 - 0.1 K/UL    ABS. IMM. GRANS. 0.0 0.00 - 0.04 K/UL    DF AUTOMATED     FERRITIN    Collection Time: 03/30/22  3:08 PM   Result Value Ref Range    Ferritin 45 8 - 252 NG/ML     1600 Labs did not met parameters for phlebotomy. Patient was given copy of her lab results. Patient was provided education, but declined the printed AVS.  Patient was discharged in stable condition. Vital signs stable throughout and prior to discharge.  Patient tolerated procedure well and was discharged without incident.   [facility] Appointment card give to the Patient    Erin Epps RN  3/30/2022  5818

## 2022-04-27 ENCOUNTER — APPOINTMENT (OUTPATIENT)
Dept: INFUSION THERAPY | Age: 62
End: 2022-04-27

## 2022-04-28 ENCOUNTER — HOSPITAL ENCOUNTER (OUTPATIENT)
Dept: INFUSION THERAPY | Age: 62
Discharge: HOME | End: 2022-04-28
Payer: COMMERCIAL

## 2022-04-28 VITALS
RESPIRATION RATE: 16 BRPM | DIASTOLIC BLOOD PRESSURE: 86 MMHG | HEART RATE: 86 BPM | TEMPERATURE: 97.5 F | SYSTOLIC BLOOD PRESSURE: 158 MMHG

## 2022-04-28 LAB
BASOPHILS # BLD: 0 K/UL (ref 0–0.1)
BASOPHILS NFR BLD: 1 % (ref 0–1)
DIFFERENTIAL METHOD BLD: ABNORMAL
EOSINOPHIL # BLD: 0.1 K/UL (ref 0–0.4)
EOSINOPHIL NFR BLD: 2 % (ref 0–7)
ERYTHROCYTE [DISTWIDTH] IN BLOOD BY AUTOMATED COUNT: 12.5 % (ref 11.5–14.5)
FERRITIN SERPL-MCNC: 58 NG/ML (ref 8–252)
HCT VFR BLD AUTO: 44.8 % (ref 35–47)
HGB BLD-MCNC: 15.3 G/DL (ref 11.5–16)
IMM GRANULOCYTES # BLD AUTO: 0 K/UL (ref 0–0.04)
IMM GRANULOCYTES NFR BLD AUTO: 0 % (ref 0–0.5)
LYMPHOCYTES # BLD: 1.6 K/UL (ref 0.8–3.5)
LYMPHOCYTES NFR BLD: 27 % (ref 12–49)
MCH RBC QN AUTO: 35.9 PG (ref 26–34)
MCHC RBC AUTO-ENTMCNC: 34.2 G/DL (ref 30–36.5)
MCV RBC AUTO: 105.2 FL (ref 80–99)
MONOCYTES # BLD: 0.6 K/UL (ref 0–1)
MONOCYTES NFR BLD: 10 % (ref 5–13)
NEUTS SEG # BLD: 3.6 K/UL (ref 1.8–8)
NEUTS SEG NFR BLD: 60 % (ref 32–75)
NRBC # BLD: 0 K/UL (ref 0–0.01)
NRBC BLD-RTO: 0 PER 100 WBC
PLATELET # BLD AUTO: 236 K/UL (ref 150–400)
PMV BLD AUTO: 9.1 FL (ref 8.9–12.9)
RBC # BLD AUTO: 4.26 M/UL (ref 3.8–5.2)
WBC # BLD AUTO: 6 K/UL (ref 3.6–11)

## 2022-04-28 PROCEDURE — 36415 COLL VENOUS BLD VENIPUNCTURE: CPT

## 2022-04-28 PROCEDURE — 99195 PHLEBOTOMY: CPT

## 2022-04-28 PROCEDURE — 85025 COMPLETE CBC W/AUTO DIFF WBC: CPT

## 2022-04-28 PROCEDURE — 82728 ASSAY OF FERRITIN: CPT

## 2022-04-28 NOTE — PROGRESS NOTES
PEDI [facility] VISIT NOTE      7150 Patient arrives for Therapeutic Phlebotomy/lab without acute problems. Please see [facility] for complete assessment and education provided. Peripheral stick to Right Hand for lab draw; labs sent for processing. Vitals Signs:  Patient Vitals for the past 12 hrs:   Temp Pulse Resp BP   04/28/22 1700  86 16 (!) 158/86   04/28/22 1458 97.5 °F (36.4 °C) 89 18 129/84        Labs:  Recent Results (from the past 12 hour(s))   CBC WITH AUTOMATED DIFF    Collection Time: 04/28/22  3:04 PM   Result Value Ref Range    WBC 6.0 3.6 - 11.0 K/uL    RBC 4.26 3.80 - 5.20 M/uL    HGB 15.3 11.5 - 16.0 g/dL    HCT 44.8 35.0 - 47.0 %    .2 (H) 80.0 - 99.0 FL    MCH 35.9 (H) 26.0 - 34.0 PG    MCHC 34.2 30.0 - 36.5 g/dL    RDW 12.5 11.5 - 14.5 %    PLATELET 042 656 - 120 K/uL    MPV 9.1 8.9 - 12.9 FL    NRBC 0.0 0  WBC    ABSOLUTE NRBC 0.00 0.00 - 0.01 K/uL    NEUTROPHILS 60 32 - 75 %    LYMPHOCYTES 27 12 - 49 %    MONOCYTES 10 5 - 13 %    EOSINOPHILS 2 0 - 7 %    BASOPHILS 1 0 - 1 %    IMMATURE GRANULOCYTES 0 0.0 - 0.5 %    ABS. NEUTROPHILS 3.6 1.8 - 8.0 K/UL    ABS. LYMPHOCYTES 1.6 0.8 - 3.5 K/UL    ABS. MONOCYTES 0.6 0.0 - 1.0 K/UL    ABS. EOSINOPHILS 0.1 0.0 - 0.4 K/UL    ABS. BASOPHILS 0.0 0.0 - 0.1 K/UL    ABS. IMM. GRANS. 0.0 0.00 - 0.04 K/UL    DF AUTOMATED     FERRITIN    Collection Time: 04/28/22  3:04 PM   Result Value Ref Range    Ferritin 58 8 - 252 NG/ML     Patient's labs within parameters for treatment; 16 gauge needle used for phlebotomy via Left AC; 500ml of whole blood removed, patient tolerated well. Gauze placed over site and pressure bandage applied. Vital signs stable throughout and prior to discharge, Patient tolerated treatment well and discharged without incident. Patient/parent is aware of next [facility] appointment 05/25/2022.

## 2022-05-25 ENCOUNTER — HOSPITAL ENCOUNTER (OUTPATIENT)
Dept: INFUSION THERAPY | Age: 62
End: 2022-05-25

## 2022-05-26 ENCOUNTER — HOSPITAL ENCOUNTER (OUTPATIENT)
Dept: INFUSION THERAPY | Age: 62
Discharge: HOME | End: 2022-05-26
Payer: COMMERCIAL

## 2022-05-26 VITALS
DIASTOLIC BLOOD PRESSURE: 82 MMHG | SYSTOLIC BLOOD PRESSURE: 120 MMHG | TEMPERATURE: 97.5 F | RESPIRATION RATE: 18 BRPM | HEART RATE: 82 BPM

## 2022-05-26 LAB
BASOPHILS # BLD: 0 K/UL (ref 0–0.1)
BASOPHILS NFR BLD: 1 % (ref 0–1)
DIFFERENTIAL METHOD BLD: ABNORMAL
EOSINOPHIL # BLD: 0.2 K/UL (ref 0–0.4)
EOSINOPHIL NFR BLD: 4 % (ref 0–7)
ERYTHROCYTE [DISTWIDTH] IN BLOOD BY AUTOMATED COUNT: 12.5 % (ref 11.5–14.5)
FERRITIN SERPL-MCNC: 40 NG/ML (ref 26–388)
HCT VFR BLD AUTO: 44.3 % (ref 35–47)
HGB BLD-MCNC: 15.3 G/DL (ref 11.5–16)
IMM GRANULOCYTES # BLD AUTO: 0 K/UL (ref 0–0.04)
IMM GRANULOCYTES NFR BLD AUTO: 0 % (ref 0–0.5)
LYMPHOCYTES # BLD: 1.5 K/UL (ref 0.8–3.5)
LYMPHOCYTES NFR BLD: 29 % (ref 12–49)
MCH RBC QN AUTO: 36.2 PG (ref 26–34)
MCHC RBC AUTO-ENTMCNC: 34.5 G/DL (ref 30–36.5)
MCV RBC AUTO: 104.7 FL (ref 80–99)
MONOCYTES # BLD: 0.6 K/UL (ref 0–1)
MONOCYTES NFR BLD: 12 % (ref 5–13)
NEUTS SEG # BLD: 2.9 K/UL (ref 1.8–8)
NEUTS SEG NFR BLD: 54 % (ref 32–75)
NRBC # BLD: 0 K/UL (ref 0–0.01)
NRBC BLD-RTO: 0 PER 100 WBC
PLATELET # BLD AUTO: 237 K/UL (ref 150–400)
PMV BLD AUTO: 9.2 FL (ref 8.9–12.9)
RBC # BLD AUTO: 4.23 M/UL (ref 3.8–5.2)
WBC # BLD AUTO: 5.3 K/UL (ref 3.6–11)

## 2022-05-26 PROCEDURE — 82728 ASSAY OF FERRITIN: CPT

## 2022-05-26 PROCEDURE — 36415 COLL VENOUS BLD VENIPUNCTURE: CPT

## 2022-05-26 PROCEDURE — 85025 COMPLETE CBC W/AUTO DIFF WBC: CPT

## 2022-05-26 NOTE — PROGRESS NOTES
PEDI [facility] VISIT NOTE      8748 Patient arrives for Therapeutic Phlebotomy/lab without acute problems. Please see [facility] for complete assessment and education provided. Peripheral stick to right hand for lab draw; labs sent for processing. Vitals Signs:  Patient Vitals for the past 12 hrs:   Temp Pulse Resp BP   05/26/22 1355 97.5 °F (36.4 °C) 82 18 120/82     Labs:  Recent Results (from the past 12 hour(s))   CBC WITH AUTOMATED DIFF    Collection Time: 05/26/22  2:02 PM   Result Value Ref Range    WBC 5.3 3.6 - 11.0 K/uL    RBC 4.23 3.80 - 5.20 M/uL    HGB 15.3 11.5 - 16.0 g/dL    HCT 44.3 35.0 - 47.0 %    .7 (H) 80.0 - 99.0 FL    MCH 36.2 (H) 26.0 - 34.0 PG    MCHC 34.5 30.0 - 36.5 g/dL    RDW 12.5 11.5 - 14.5 %    PLATELET 561 232 - 912 K/uL    MPV 9.2 8.9 - 12.9 FL    NRBC 0.0 0  WBC    ABSOLUTE NRBC 0.00 0.00 - 0.01 K/uL    NEUTROPHILS 54 32 - 75 %    LYMPHOCYTES 29 12 - 49 %    MONOCYTES 12 5 - 13 %    EOSINOPHILS 4 0 - 7 %    BASOPHILS 1 0 - 1 %    IMMATURE GRANULOCYTES 0 0.0 - 0.5 %    ABS. NEUTROPHILS 2.9 1.8 - 8.0 K/UL    ABS. LYMPHOCYTES 1.5 0.8 - 3.5 K/UL    ABS. MONOCYTES 0.6 0.0 - 1.0 K/UL    ABS. EOSINOPHILS 0.2 0.0 - 0.4 K/UL    ABS. BASOPHILS 0.0 0.0 - 0.1 K/UL    ABS. IMM. GRANS. 0.0 0.00 - 0.04 K/UL    DF AUTOMATED     FERRITIN    Collection Time: 05/26/22  2:02 PM   Result Value Ref Range    Ferritin 40 26 - 388 NG/ML       Patient's phlebotomy held due to lab not in parameters for treatment; patient's Ferritin = 40 with MD orders to hold Phlebotomy for Ferritin less than 50. Vital signs stable throughout and prior to discharge, Patient tolerated treatment well and discharged without incident. Patient is aware of next [facility] appointment 06/23/2022.

## 2022-06-22 ENCOUNTER — APPOINTMENT (OUTPATIENT)
Dept: INFUSION THERAPY | Age: 62
End: 2022-06-22
Payer: COMMERCIAL

## 2022-06-23 ENCOUNTER — HOSPITAL ENCOUNTER (OUTPATIENT)
Dept: INFUSION THERAPY | Age: 62
End: 2022-06-23

## 2022-06-30 ENCOUNTER — HOSPITAL ENCOUNTER (OUTPATIENT)
Dept: INFUSION THERAPY | Age: 62
Discharge: HOME | End: 2022-06-30
Payer: COMMERCIAL

## 2022-06-30 VITALS
DIASTOLIC BLOOD PRESSURE: 84 MMHG | RESPIRATION RATE: 16 BRPM | HEART RATE: 82 BPM | SYSTOLIC BLOOD PRESSURE: 128 MMHG | OXYGEN SATURATION: 98 % | TEMPERATURE: 97.4 F

## 2022-06-30 LAB
BASOPHILS # BLD: 0 K/UL (ref 0–0.1)
BASOPHILS NFR BLD: 0 % (ref 0–1)
DIFFERENTIAL METHOD BLD: ABNORMAL
EOSINOPHIL # BLD: 0.1 K/UL (ref 0–0.4)
EOSINOPHIL NFR BLD: 2 % (ref 0–7)
ERYTHROCYTE [DISTWIDTH] IN BLOOD BY AUTOMATED COUNT: 12.3 % (ref 11.5–14.5)
FERRITIN SERPL-MCNC: 55 NG/ML (ref 26–388)
HCT VFR BLD AUTO: 42.7 % (ref 35–47)
HGB BLD-MCNC: 15.1 G/DL (ref 11.5–16)
IMM GRANULOCYTES # BLD AUTO: 0 K/UL (ref 0–0.04)
IMM GRANULOCYTES NFR BLD AUTO: 0 % (ref 0–0.5)
LYMPHOCYTES # BLD: 1.6 K/UL (ref 0.8–3.5)
LYMPHOCYTES NFR BLD: 29 % (ref 12–49)
MCH RBC QN AUTO: 36.6 PG (ref 26–34)
MCHC RBC AUTO-ENTMCNC: 35.4 G/DL (ref 30–36.5)
MCV RBC AUTO: 103.4 FL (ref 80–99)
MONOCYTES # BLD: 0.6 K/UL (ref 0–1)
MONOCYTES NFR BLD: 11 % (ref 5–13)
NEUTS SEG # BLD: 3.2 K/UL (ref 1.8–8)
NEUTS SEG NFR BLD: 58 % (ref 32–75)
NRBC # BLD: 0 K/UL (ref 0–0.01)
NRBC BLD-RTO: 0 PER 100 WBC
PLATELET # BLD AUTO: 231 K/UL (ref 150–400)
PMV BLD AUTO: 9.3 FL (ref 8.9–12.9)
RBC # BLD AUTO: 4.13 M/UL (ref 3.8–5.2)
WBC # BLD AUTO: 5.5 K/UL (ref 3.6–11)

## 2022-06-30 PROCEDURE — 99195 PHLEBOTOMY: CPT

## 2022-06-30 PROCEDURE — 82728 ASSAY OF FERRITIN: CPT

## 2022-06-30 PROCEDURE — 36415 COLL VENOUS BLD VENIPUNCTURE: CPT

## 2022-06-30 PROCEDURE — 85025 COMPLETE CBC W/AUTO DIFF WBC: CPT

## 2022-06-30 NOTE — PROGRESS NOTES
PEDI [facility] VISIT NOTE      1698 Patient arrives for Therapeutic Phlebotomy/lab without acute problems. Please see [facility] for complete assessment and education provided. Peripheral stick to Right Hand for lab draw; labs sent for processing. Vitals Signs:  Patient Vitals for the past 12 hrs:   Temp Pulse Resp BP SpO2   06/30/22 1518 -- 82 16 128/84 --   06/30/22 1355 97.4 °F (36.3 °C) 76 18 135/88 98 %     Labs:  Recent Results (from the past 12 hour(s))   CBC WITH AUTOMATED DIFF    Collection Time: 06/30/22  2:03 PM   Result Value Ref Range    WBC 5.5 3.6 - 11.0 K/uL    RBC 4.13 3.80 - 5.20 M/uL    HGB 15.1 11.5 - 16.0 g/dL    HCT 42.7 35.0 - 47.0 %    .4 (H) 80.0 - 99.0 FL    MCH 36.6 (H) 26.0 - 34.0 PG    MCHC 35.4 30.0 - 36.5 g/dL    RDW 12.3 11.5 - 14.5 %    PLATELET 395 055 - 727 K/uL    MPV 9.3 8.9 - 12.9 FL    NRBC 0.0 0  WBC    ABSOLUTE NRBC 0.00 0.00 - 0.01 K/uL    NEUTROPHILS 58 32 - 75 %    LYMPHOCYTES 29 12 - 49 %    MONOCYTES 11 5 - 13 %    EOSINOPHILS 2 0 - 7 %    BASOPHILS 0 0 - 1 %    IMMATURE GRANULOCYTES 0 0.0 - 0.5 %    ABS. NEUTROPHILS 3.2 1.8 - 8.0 K/UL    ABS. LYMPHOCYTES 1.6 0.8 - 3.5 K/UL    ABS. MONOCYTES 0.6 0.0 - 1.0 K/UL    ABS. EOSINOPHILS 0.1 0.0 - 0.4 K/UL    ABS. BASOPHILS 0.0 0.0 - 0.1 K/UL    ABS. IMM. GRANS. 0.0 0.00 - 0.04 K/UL    DF AUTOMATED     FERRITIN    Collection Time: 06/30/22  2:03 PM   Result Value Ref Range    Ferritin 55 26 - 388 NG/ML     Patient's labs within parameters for treatment; 16 gauge needle used for phlebotomy via Left AC; 500ml of whole blood removed, patient tolerated well. Gauze placed over site and pressure bandage applied. Vital signs stable throughout and prior to discharge, Patient tolerated treatment well and discharged without incident. Patient is aware of next [facility] appointment 07/28/2022.

## 2022-07-28 ENCOUNTER — HOSPITAL ENCOUNTER (OUTPATIENT)
Dept: INFUSION THERAPY | Age: 62
Discharge: HOME | End: 2022-07-28
Payer: COMMERCIAL

## 2022-07-28 VITALS
TEMPERATURE: 97.7 F | SYSTOLIC BLOOD PRESSURE: 136 MMHG | RESPIRATION RATE: 18 BRPM | DIASTOLIC BLOOD PRESSURE: 88 MMHG | HEART RATE: 95 BPM

## 2022-07-28 LAB
BASOPHILS # BLD: 0 K/UL (ref 0–0.1)
BASOPHILS NFR BLD: 1 % (ref 0–1)
DIFFERENTIAL METHOD BLD: ABNORMAL
EOSINOPHIL # BLD: 0.1 K/UL (ref 0–0.4)
EOSINOPHIL NFR BLD: 1 % (ref 0–7)
ERYTHROCYTE [DISTWIDTH] IN BLOOD BY AUTOMATED COUNT: 12 % (ref 11.5–14.5)
FERRITIN SERPL-MCNC: 35 NG/ML (ref 26–388)
HCT VFR BLD AUTO: 43.2 % (ref 35–47)
HGB BLD-MCNC: 15.5 G/DL (ref 11.5–16)
IMM GRANULOCYTES # BLD AUTO: 0 K/UL (ref 0–0.04)
IMM GRANULOCYTES NFR BLD AUTO: 0 % (ref 0–0.5)
LYMPHOCYTES # BLD: 2.1 K/UL (ref 0.8–3.5)
LYMPHOCYTES NFR BLD: 30 % (ref 12–49)
MCH RBC QN AUTO: 37 PG (ref 26–34)
MCHC RBC AUTO-ENTMCNC: 35.9 G/DL (ref 30–36.5)
MCV RBC AUTO: 103.1 FL (ref 80–99)
MONOCYTES # BLD: 0.8 K/UL (ref 0–1)
MONOCYTES NFR BLD: 11 % (ref 5–13)
NEUTS SEG # BLD: 4 K/UL (ref 1.8–8)
NEUTS SEG NFR BLD: 57 % (ref 32–75)
NRBC # BLD: 0 K/UL (ref 0–0.01)
NRBC BLD-RTO: 0 PER 100 WBC
PLATELET # BLD AUTO: 223 K/UL (ref 150–400)
PMV BLD AUTO: 9.1 FL (ref 8.9–12.9)
RBC # BLD AUTO: 4.19 M/UL (ref 3.8–5.2)
WBC # BLD AUTO: 7 K/UL (ref 3.6–11)

## 2022-07-28 PROCEDURE — 82728 ASSAY OF FERRITIN: CPT

## 2022-07-28 PROCEDURE — 36415 COLL VENOUS BLD VENIPUNCTURE: CPT

## 2022-07-28 PROCEDURE — 85025 COMPLETE CBC W/AUTO DIFF WBC: CPT

## 2022-07-28 NOTE — PROGRESS NOTES
[address] [facility] @ [facility] VISIT NOTE     7447: Patient arrives for Therapeutic Phlebotomy and Labs without acute problems. Due to ferritin levels, patient did not require phlebotomy today. Please see connect care for complete assessment and education provided. Vital signs stable throughout and prior to discharge, Pt. Tolerated treatment well and discharged without incident. Patient/parent is aware of next [facility] appointment on 08/24/2022. Appointment card given to patient/parents. The patient/parent denies any symptoms of COVID (SOB, coughing, fever, or generally not feeling well), denies any known exposure to COVID-19 recently, and denies any known recent contact with family/friend that has a pending COVID test.       VITAL SIGNS   Patient Vitals for the past 12 hrs:   Temp Pulse Resp BP   07/28/22 1505 97.7 °F (36.5 °C) 95 18 136/88           LAB WORK   Recent Results (from the past 12 hour(s))   CBC WITH AUTOMATED DIFF    Collection Time: 07/28/22  3:11 PM   Result Value Ref Range    WBC 7.0 3.6 - 11.0 K/uL    RBC 4.19 3.80 - 5.20 M/uL    HGB 15.5 11.5 - 16.0 g/dL    HCT 43.2 35.0 - 47.0 %    .1 (H) 80.0 - 99.0 FL    MCH 37.0 (H) 26.0 - 34.0 PG    MCHC 35.9 30.0 - 36.5 g/dL    RDW 12.0 11.5 - 14.5 %    PLATELET 791 408 - 810 K/uL    MPV 9.1 8.9 - 12.9 FL    NRBC 0.0 0  WBC    ABSOLUTE NRBC 0.00 0.00 - 0.01 K/uL    NEUTROPHILS 57 32 - 75 %    LYMPHOCYTES 30 12 - 49 %    MONOCYTES 11 5 - 13 %    EOSINOPHILS 1 0 - 7 %    BASOPHILS 1 0 - 1 %    IMMATURE GRANULOCYTES 0 0.0 - 0.5 %    ABS. NEUTROPHILS 4.0 1.8 - 8.0 K/UL    ABS. LYMPHOCYTES 2.1 0.8 - 3.5 K/UL    ABS. MONOCYTES 0.8 0.0 - 1.0 K/UL    ABS. EOSINOPHILS 0.1 0.0 - 0.4 K/UL    ABS. BASOPHILS 0.0 0.0 - 0.1 K/UL    ABS. IMM.  GRANS. 0.0 0.00 - 0.04 K/UL    DF AUTOMATED     FERRITIN    Collection Time: 07/28/22  3:11 PM   Result Value Ref Range    Ferritin 35 26 - 388 NG/ML

## 2022-08-24 ENCOUNTER — HOSPITAL ENCOUNTER (OUTPATIENT)
Dept: INFUSION THERAPY | Age: 62
Discharge: HOME | End: 2022-08-24
Payer: COMMERCIAL

## 2022-08-24 VITALS
SYSTOLIC BLOOD PRESSURE: 121 MMHG | OXYGEN SATURATION: 98 % | RESPIRATION RATE: 18 BRPM | HEART RATE: 94 BPM | DIASTOLIC BLOOD PRESSURE: 88 MMHG | TEMPERATURE: 97.6 F

## 2022-08-24 LAB
ERYTHROCYTE [DISTWIDTH] IN BLOOD BY AUTOMATED COUNT: 12 % (ref 11.5–14.5)
FERRITIN SERPL-MCNC: 67 NG/ML (ref 26–388)
HCT VFR BLD AUTO: 42.7 % (ref 35–47)
HGB BLD-MCNC: 15.6 G/DL (ref 11.5–16)
MCH RBC QN AUTO: 37.1 PG (ref 26–34)
MCHC RBC AUTO-ENTMCNC: 36.5 G/DL (ref 30–36.5)
MCV RBC AUTO: 101.4 FL (ref 80–99)
NRBC # BLD: 0 K/UL (ref 0–0.01)
NRBC BLD-RTO: 0 PER 100 WBC
PLATELET # BLD AUTO: 190 K/UL (ref 150–400)
PMV BLD AUTO: 9.2 FL (ref 8.9–12.9)
RBC # BLD AUTO: 4.21 M/UL (ref 3.8–5.2)
WBC # BLD AUTO: 6.1 K/UL (ref 3.6–11)

## 2022-08-24 PROCEDURE — 99195 PHLEBOTOMY: CPT

## 2022-08-24 PROCEDURE — 85027 COMPLETE CBC AUTOMATED: CPT

## 2022-08-24 PROCEDURE — 82728 ASSAY OF FERRITIN: CPT

## 2022-08-24 PROCEDURE — 36415 COLL VENOUS BLD VENIPUNCTURE: CPT

## 2022-08-24 NOTE — PROGRESS NOTES
PEDI [facility] VISIT NOTE      1410 Patient arrives for Therapeutic Phlebotomy/lab without acute problems. Please see [facility] for complete assessment and education provided. Peripheral stick to Right Hand for lab draw; labs sent for processing. Vitals Signs:  Patient Vitals for the past 12 hrs:   Temp Pulse Resp BP SpO2   08/24/22 1515 -- 94 18 121/88 --   08/24/22 1411 97.6 °F (36.4 °C) 96 18 131/86 98 %     Labs:  Recent Results (from the past 12 hour(s))   CBC W/O DIFF    Collection Time: 08/24/22  2:18 PM   Result Value Ref Range    WBC 6.1 3.6 - 11.0 K/uL    RBC 4.21 3.80 - 5.20 M/uL    HGB 15.6 11.5 - 16.0 g/dL    HCT 42.7 35.0 - 47.0 %    .4 (H) 80.0 - 99.0 FL    MCH 37.1 (H) 26.0 - 34.0 PG    MCHC 36.5 30.0 - 36.5 g/dL    RDW 12.0 11.5 - 14.5 %    PLATELET 678 100 - 472 K/uL    MPV 9.2 8.9 - 12.9 FL    NRBC 0.0 0  WBC    ABSOLUTE NRBC 0.00 0.00 - 0.01 K/uL   FERRITIN    Collection Time: 08/24/22  2:18 PM   Result Value Ref Range    Ferritin 67 26 - 388 NG/ML      Patient's labs within parameters for treatment; 16 gauge needle used for phlebotomy via Left AC; 500ml of whole blood removed, patient tolerated well. Gauze placed over site and pressure bandage applied. Vital signs stable throughout and prior to discharge, Patient tolerated treatment well and discharged without incident. Patient is aware of next [facility] appointment 09/21/2022.

## 2022-09-21 ENCOUNTER — HOSPITAL ENCOUNTER (OUTPATIENT)
Dept: INFUSION THERAPY | Age: 62
Discharge: HOME | End: 2022-09-21
Payer: COMMERCIAL

## 2022-09-21 VITALS
RESPIRATION RATE: 18 BRPM | DIASTOLIC BLOOD PRESSURE: 89 MMHG | TEMPERATURE: 97.6 F | OXYGEN SATURATION: 98 % | HEART RATE: 95 BPM | SYSTOLIC BLOOD PRESSURE: 144 MMHG

## 2022-09-21 LAB
BASOPHILS # BLD: 0 K/UL (ref 0–0.1)
BASOPHILS NFR BLD: 1 % (ref 0–1)
DIFFERENTIAL METHOD BLD: ABNORMAL
EOSINOPHIL # BLD: 0.1 K/UL (ref 0–0.4)
EOSINOPHIL NFR BLD: 1 % (ref 0–7)
ERYTHROCYTE [DISTWIDTH] IN BLOOD BY AUTOMATED COUNT: 12.7 % (ref 11.5–14.5)
FERRITIN SERPL-MCNC: 43 NG/ML (ref 8–252)
HCT VFR BLD AUTO: 40.6 % (ref 35–47)
HGB BLD-MCNC: 14.6 G/DL (ref 11.5–16)
IMM GRANULOCYTES # BLD AUTO: 0 K/UL (ref 0–0.04)
IMM GRANULOCYTES NFR BLD AUTO: 0 % (ref 0–0.5)
LYMPHOCYTES # BLD: 1.5 K/UL (ref 0.8–3.5)
LYMPHOCYTES NFR BLD: 23 % (ref 12–49)
MCH RBC QN AUTO: 37.2 PG (ref 26–34)
MCHC RBC AUTO-ENTMCNC: 36 G/DL (ref 30–36.5)
MCV RBC AUTO: 103.3 FL (ref 80–99)
MONOCYTES # BLD: 0.7 K/UL (ref 0–1)
MONOCYTES NFR BLD: 11 % (ref 5–13)
NEUTS SEG # BLD: 4.2 K/UL (ref 1.8–8)
NEUTS SEG NFR BLD: 64 % (ref 32–75)
NRBC # BLD: 0 K/UL (ref 0–0.01)
NRBC BLD-RTO: 0 PER 100 WBC
PLATELET # BLD AUTO: 215 K/UL (ref 150–400)
PMV BLD AUTO: 9.2 FL (ref 8.9–12.9)
RBC # BLD AUTO: 3.93 M/UL (ref 3.8–5.2)
WBC # BLD AUTO: 6.5 K/UL (ref 3.6–11)

## 2022-09-21 PROCEDURE — 36415 COLL VENOUS BLD VENIPUNCTURE: CPT

## 2022-09-21 PROCEDURE — 82728 ASSAY OF FERRITIN: CPT

## 2022-09-21 PROCEDURE — 85025 COMPLETE CBC W/AUTO DIFF WBC: CPT

## 2022-09-21 NOTE — PROGRESS NOTES
[address] [facility] @ [facility] VISIT NOTE     7395: Patient arrives for Phlebotomy and Labs. Due to lab results - patient did not receive phlebotomy today. Please see connect care for complete assessment and education provided. Vital signs stable throughout and prior to discharge, Pt was discharged without incident. Patient is aware of next [facility] appointment on 10/19/2019. Appointment card given to patient/parents. VITAL SIGNS   Patient Vitals for the past 12 hrs:   Temp Pulse Resp BP SpO2   09/21/22 1405 97.6 °F (36.4 °C) 95 18 (!) 144/89 98 %        LAB WORK   Recent Results (from the past 12 hour(s))   CBC WITH AUTOMATED DIFF    Collection Time: 09/21/22  2:14 PM   Result Value Ref Range    WBC 6.5 3.6 - 11.0 K/uL    RBC 3.93 3.80 - 5.20 M/uL    HGB 14.6 11.5 - 16.0 g/dL    HCT 40.6 35.0 - 47.0 %    .3 (H) 80.0 - 99.0 FL    MCH 37.2 (H) 26.0 - 34.0 PG    MCHC 36.0 30.0 - 36.5 g/dL    RDW 12.7 11.5 - 14.5 %    PLATELET 316 526 - 982 K/uL    MPV 9.2 8.9 - 12.9 FL    NRBC 0.0 0  WBC    ABSOLUTE NRBC 0.00 0.00 - 0.01 K/uL    NEUTROPHILS 64 32 - 75 %    LYMPHOCYTES 23 12 - 49 %    MONOCYTES 11 5 - 13 %    EOSINOPHILS 1 0 - 7 %    BASOPHILS 1 0 - 1 %    IMMATURE GRANULOCYTES 0 0.0 - 0.5 %    ABS. NEUTROPHILS 4.2 1.8 - 8.0 K/UL    ABS. LYMPHOCYTES 1.5 0.8 - 3.5 K/UL    ABS. MONOCYTES 0.7 0.0 - 1.0 K/UL    ABS. EOSINOPHILS 0.1 0.0 - 0.4 K/UL    ABS. BASOPHILS 0.0 0.0 - 0.1 K/UL    ABS. IMM.  GRANS. 0.0 0.00 - 0.04 K/UL    DF AUTOMATED     FERRITIN    Collection Time: 09/21/22  2:14 PM   Result Value Ref Range    Ferritin 43 8 - 252 NG/ML

## 2022-10-19 ENCOUNTER — HOSPITAL ENCOUNTER (OUTPATIENT)
Dept: INFUSION THERAPY | Age: 62
End: 2022-10-19

## 2022-10-25 ENCOUNTER — HOSPITAL ENCOUNTER (OUTPATIENT)
Dept: INFUSION THERAPY | Age: 62
Discharge: HOME | End: 2022-10-25
Payer: COMMERCIAL

## 2022-10-25 VITALS
DIASTOLIC BLOOD PRESSURE: 88 MMHG | HEART RATE: 83 BPM | RESPIRATION RATE: 18 BRPM | SYSTOLIC BLOOD PRESSURE: 152 MMHG | TEMPERATURE: 97.5 F

## 2022-10-25 LAB
FERRITIN SERPL-MCNC: 36 NG/ML (ref 8–252)
HCT VFR BLD AUTO: 43.5 % (ref 35–47)
HGB BLD-MCNC: 15.6 G/DL (ref 11.5–16)

## 2022-10-25 PROCEDURE — 36415 COLL VENOUS BLD VENIPUNCTURE: CPT

## 2022-10-25 PROCEDURE — 82728 ASSAY OF FERRITIN: CPT

## 2022-10-25 PROCEDURE — 85018 HEMOGLOBIN: CPT

## 2022-10-25 NOTE — PROGRESS NOTES
PEDI [facility] VISIT NOTE      9991 Patient arrives for Therapeutic Phlebotomy/lab without acute problems. Please see [facility] for complete assessment and education provided. Peripheral stick to Right Hand for lab draw; labs sent for processing. Vitals Signs:  Patient Vitals for the past 12 hrs:   Temp Pulse Resp BP   10/25/22 1506 97.5 °F (36.4 °C) 83 18 (!) 152/88     Labs:  Recent Results (from the past 12 hour(s))   HGB & HCT    Collection Time: 10/25/22  3:12 PM   Result Value Ref Range    HGB 15.6 11.5 - 16.0 g/dL    HCT 43.5 35.0 - 47.0 %   FERRITIN    Collection Time: 10/25/22  3:12 PM   Result Value Ref Range    Ferritin 36 8 - 252 NG/ML      Patient's phlebotomy held due to lab not in parameters for treatment; patient's Ferritin = 36 with MD orders to Hold Phlebotomy for Ferritin less than 50. Vital signs stable throughout and prior to discharge, Patient tolerated treatment well and discharged without incident. Patient is aware of next [facility] appointment 11/30/2022.

## 2022-11-16 ENCOUNTER — APPOINTMENT (OUTPATIENT)
Dept: INFUSION THERAPY | Age: 62
End: 2022-11-16
Payer: COMMERCIAL

## 2022-11-30 ENCOUNTER — HOSPITAL ENCOUNTER (OUTPATIENT)
Dept: INFUSION THERAPY | Age: 62
Discharge: HOME | End: 2022-11-30
Payer: COMMERCIAL

## 2022-11-30 LAB
FERRITIN SERPL-MCNC: 45 NG/ML (ref 26–388)
HCT VFR BLD AUTO: 44.1 % (ref 35–47)
HGB BLD-MCNC: 15.2 G/DL (ref 11.5–16)

## 2022-11-30 PROCEDURE — 85018 HEMOGLOBIN: CPT

## 2022-11-30 PROCEDURE — 82728 ASSAY OF FERRITIN: CPT

## 2022-11-30 PROCEDURE — 36415 COLL VENOUS BLD VENIPUNCTURE: CPT

## 2022-11-30 NOTE — PROGRESS NOTES
PEDI [facility] VISIT NOTE      0819 Patient arrives for Therapeutic Phlebotomy/lab without acute problems. Please see [facility] for complete assessment and education provided. Peripheral stick to Right Hand for lab draw; labs sent for processing. Vitals Signs:  Patient Vitals for the past 12 hrs:   Temp Pulse Resp BP   11/30/22 1452 (P) 97.7 °F (36.5 °C) (P) 88 (P) 18 (!) (P) 140/88     Labs:  Recent Results (from the past 12 hour(s))   HGB & HCT    Collection Time: 11/30/22  2:58 PM   Result Value Ref Range    HGB 15.2 11.5 - 16.0 g/dL    HCT 44.1 35.0 - 47.0 %   FERRITIN    Collection Time: 11/30/22  2:58 PM   Result Value Ref Range    Ferritin 45 26 - 388 NG/ML      Patient's Phlebotomy Held due to lab not in parameters for treatment; patient's Ferritin = 45 with MD orders to Hold Phlebotomy for Ferritin less than 50. Vital signs stable, Patient tolerated treatment well and discharged without incident. Patient is aware of next [address] [place] [address] appointment 12/28/2022.

## 2022-12-28 ENCOUNTER — HOSPITAL ENCOUNTER (OUTPATIENT)
Dept: INFUSION THERAPY | Age: 62
Discharge: HOME | End: 2022-12-28
Payer: COMMERCIAL

## 2022-12-28 VITALS
SYSTOLIC BLOOD PRESSURE: 146 MMHG | DIASTOLIC BLOOD PRESSURE: 86 MMHG | TEMPERATURE: 97.7 F | RESPIRATION RATE: 18 BRPM | HEART RATE: 87 BPM

## 2022-12-28 LAB
FERRITIN SERPL-MCNC: 82 NG/ML (ref 8–252)
HCT VFR BLD AUTO: 39.9 % (ref 35–47)
HGB BLD-MCNC: 14.4 G/DL (ref 11.5–16)

## 2022-12-28 PROCEDURE — 85018 HEMOGLOBIN: CPT

## 2022-12-28 PROCEDURE — 36415 COLL VENOUS BLD VENIPUNCTURE: CPT

## 2022-12-28 PROCEDURE — 99195 PHLEBOTOMY: CPT

## 2022-12-28 PROCEDURE — 82728 ASSAY OF FERRITIN: CPT

## 2022-12-28 NOTE — PROGRESS NOTES
PEDI [facility] VISIT NOTE      1500 Patient arrives for Therapeutic Phlebotomy/lab without acute problems. Please see [facility] for complete assessment and education provided. Peripheral stick to Right Hand for lab draw; labs sent for processing. Vitals Signs:  Patient Vitals for the past 12 hrs:   Temp Pulse Resp BP   12/28/22 1605 97.7 °F (36.5 °C) 87 18 (!) 146/86   12/28/22 1501 98 °F (36.7 °C) 85 18 137/89     Labs:  Recent Results (from the past 12 hour(s))   HGB & HCT    Collection Time: 12/28/22  3:07 PM   Result Value Ref Range    HGB 14.4 11.5 - 16.0 g/dL    HCT 39.9 35.0 - 47.0 %   FERRITIN    Collection Time: 12/28/22  3:07 PM   Result Value Ref Range    Ferritin 82 8 - 252 NG/ML     Patient's labs within parameters for treatment; 16 gauge needle used for phlebotomy; 500ml of whole blood removed via patient's Left AC, patient tolerated well. Gauze placed over site and pressure bandage applied. Vital signs stable throughout and prior to discharge, Patient tolerated treatment well and discharged without incident. Patient/parent is aware of next [address] appointment 01/25/2023.

## 2023-01-25 ENCOUNTER — HOSPITAL ENCOUNTER (OUTPATIENT)
Dept: INFUSION THERAPY | Age: 63
Discharge: HOME | End: 2023-01-25
Payer: COMMERCIAL

## 2023-01-25 VITALS
DIASTOLIC BLOOD PRESSURE: 81 MMHG | SYSTOLIC BLOOD PRESSURE: 119 MMHG | TEMPERATURE: 97.5 F | HEART RATE: 82 BPM | RESPIRATION RATE: 18 BRPM

## 2023-01-25 LAB
FERRITIN SERPL-MCNC: 42 NG/ML (ref 8–252)
HCT VFR BLD AUTO: 42.3 % (ref 35–47)
HGB BLD-MCNC: 14.3 G/DL (ref 11.5–16)

## 2023-01-25 PROCEDURE — 85018 HEMOGLOBIN: CPT

## 2023-01-25 PROCEDURE — 82728 ASSAY OF FERRITIN: CPT

## 2023-01-25 PROCEDURE — 36415 COLL VENOUS BLD VENIPUNCTURE: CPT

## 2023-01-25 NOTE — PROGRESS NOTES
PEDI [facility] VISIT NOTE      1500 Patient arrives for Therapeutic Phlebotomy/lab without acute problems. Please see [facility] for complete assessment and education provided. Peripheral stick to right hand for lab draw; labs sent for processing. Ms. Buster Oliver vitals were reviewed prior to and after treatment. Patient Vitals for the past 12 hrs:   Temp Pulse Resp BP   01/25/23 1502 97.5 °F (36.4 °C) 82 18 119/81        Lab results were obtained and reviewed. Recent Results (from the past 12 hour(s))   HGB & HCT    Collection Time: 01/25/23  3:05 PM   Result Value Ref Range    HGB 14.3 11.5 - 16.0 g/dL    HCT 42.3 35.0 - 47.0 %   FERRITIN    Collection Time: 01/25/23  3:05 PM   Result Value Ref Range    Ferritin 42 8 - 252 NG/ML       Patient's labs not within parameters for treatment. Vital signs stable throughout and prior to discharge. Patient tolerated procedure well and was discharged without incident.   Patient is aware of next [facility] appointment on  2/22/2023  Appointment card give to the Patient    Future Appointments   Date Time Provider Papa Huntley   2/22/2023  3:00 PM B4 PEDS [facility]       Nato Wayne RN  January 25, 2023  4:28 PM

## 2023-02-11 RX ORDER — SODIUM CHLORIDE 9 MG/ML
25 INJECTION, SOLUTION INTRAVENOUS CONTINUOUS
Status: DISCONTINUED | OUTPATIENT
Start: 2023-02-22 | End: 2023-02-23 | Stop reason: HOSPADM

## 2023-02-22 ENCOUNTER — HOSPITAL ENCOUNTER (OUTPATIENT)
Dept: INFUSION THERAPY | Age: 63
Discharge: HOME | End: 2023-02-22
Payer: COMMERCIAL

## 2023-02-22 VITALS
SYSTOLIC BLOOD PRESSURE: 159 MMHG | HEART RATE: 85 BPM | TEMPERATURE: 97.8 F | DIASTOLIC BLOOD PRESSURE: 96 MMHG | RESPIRATION RATE: 18 BRPM

## 2023-02-22 LAB
FERRITIN SERPL-MCNC: 79 NG/ML (ref 8–252)
HCT VFR BLD AUTO: 42.7 % (ref 35–47)
HGB BLD-MCNC: 14.6 G/DL (ref 11.5–16)

## 2023-02-22 PROCEDURE — 99195 PHLEBOTOMY: CPT

## 2023-02-22 PROCEDURE — 36415 COLL VENOUS BLD VENIPUNCTURE: CPT

## 2023-02-22 PROCEDURE — 85018 HEMOGLOBIN: CPT

## 2023-02-22 PROCEDURE — 82728 ASSAY OF FERRITIN: CPT

## 2023-03-22 ENCOUNTER — HOSPITAL ENCOUNTER (OUTPATIENT)
Dept: INFUSION THERAPY | Age: 63
Discharge: HOME | End: 2023-03-22
Payer: COMMERCIAL

## 2023-03-22 VITALS
HEART RATE: 77 BPM | DIASTOLIC BLOOD PRESSURE: 92 MMHG | SYSTOLIC BLOOD PRESSURE: 156 MMHG | TEMPERATURE: 97.9 F | RESPIRATION RATE: 17 BRPM

## 2023-03-22 LAB
FERRITIN SERPL-MCNC: 25 NG/ML (ref 8–252)
HCT VFR BLD AUTO: 39.6 % (ref 35–47)
HGB BLD-MCNC: 13.4 G/DL (ref 11.5–16)

## 2023-03-22 PROCEDURE — 82728 ASSAY OF FERRITIN: CPT

## 2023-03-22 PROCEDURE — 36415 COLL VENOUS BLD VENIPUNCTURE: CPT

## 2023-03-22 PROCEDURE — 85018 HEMOGLOBIN: CPT

## 2023-03-22 NOTE — PROGRESS NOTES
[facility] VISIT NOTE     9868 Patient arrives for Therapeutic Phlebotomy/lab without acute problems. Please see [facility] for complete assessment and education provided. Peripheral stick to R hand for lab draw; labs sent for processing. Vital signs stable. Patient's phlebotomy held due to lab not in parameters for treatment; Ferritin level 25. Spoke with lab, repeated processing- repeat Ferritin remained at 25. MD orders to hold phlebotomy for ferritin less than 50. Patient is aware of next [facility] appointment on 04/19/2023. Appointment card given to patient.     VITAL SIGNS Patient Vitals for the past 12 hrs:   Temp Pulse Resp BP   03/22/23 1356 97.9 °F (36.6 °C) 77 17 (!) 156/92      LAB WORK   Recent Results (from the past 12 hour(s))   HGB & HCT    Collection Time: 03/22/23  2:02 PM   Result Value Ref Range    HGB 13.4 11.5 - 16.0 g/dL    HCT 39.6 35.0 - 47.0 %   FERRITIN    Collection Time: 03/22/23  2:02 PM   Result Value Ref Range    Ferritin 25 8 - 252 NG/ML

## 2023-04-19 ENCOUNTER — HOSPITAL ENCOUNTER (OUTPATIENT)
Dept: INFUSION THERAPY | Age: 63
Discharge: HOME | End: 2023-04-19
Payer: COMMERCIAL

## 2023-04-19 VITALS
SYSTOLIC BLOOD PRESSURE: 126 MMHG | HEART RATE: 78 BPM | RESPIRATION RATE: 18 BRPM | TEMPERATURE: 98.2 F | DIASTOLIC BLOOD PRESSURE: 79 MMHG

## 2023-04-19 LAB
FERRITIN SERPL-MCNC: 26 NG/ML (ref 26–388)
HCT VFR BLD AUTO: 43.6 % (ref 35–47)
HGB BLD-MCNC: 15 G/DL (ref 11.5–16)

## 2023-04-19 PROCEDURE — 85018 HEMOGLOBIN: CPT

## 2023-04-19 PROCEDURE — 36415 COLL VENOUS BLD VENIPUNCTURE: CPT

## 2023-04-19 PROCEDURE — 82728 ASSAY OF FERRITIN: CPT

## 2023-04-19 NOTE — PROGRESS NOTES
[address] [facility] VISIT [facility]     1500 Patient arrives for Therapeutic Phlebotomy/lab without acute problems. Please see [facility] for complete assessment and education provided. Vital signs stable. Patient's phlebotomy held due to lab not in parameters for treatment per MD order. Patient is aware of next [facility] appointment on 05/17/2023. Appointment card given to patient.     VITAL SIGNS Patient Vitals for the past 12 hrs:   Temp Pulse Resp BP   04/19/23 1600 -- 78 18 126/79   04/19/23 1458 98.2 °F (36.8 °C) 86 18 138/85      LAB WORK   Recent Results (from the past 12 hour(s))   HGB & HCT    Collection Time: 04/19/23  3:04 PM   Result Value Ref Range    HGB 15.0 11.5 - 16.0 g/dL    HCT 43.6 35.0 - 47.0 %   FERRITIN    Collection Time: 04/19/23  3:04 PM   Result Value Ref Range    Ferritin 26 26 - 388 NG/ML

## 2023-05-17 ENCOUNTER — HOSPITAL ENCOUNTER (OUTPATIENT)
Dept: INFUSION THERAPY | Age: 63
End: 2023-05-17

## 2023-05-17 ENCOUNTER — APPOINTMENT (OUTPATIENT)
Facility: HOSPITAL | Age: 63
End: 2023-05-17
Payer: COMMERCIAL

## 2023-05-19 ENCOUNTER — HOSPITAL ENCOUNTER (OUTPATIENT)
Facility: HOSPITAL | Age: 63
Setting detail: INFUSION SERIES
End: 2023-05-19
Payer: COMMERCIAL

## 2023-05-19 VITALS
TEMPERATURE: 97.9 F | RESPIRATION RATE: 18 BRPM | HEART RATE: 75 BPM | DIASTOLIC BLOOD PRESSURE: 86 MMHG | SYSTOLIC BLOOD PRESSURE: 129 MMHG

## 2023-05-19 LAB
FERRITIN SERPL-MCNC: 54 NG/ML (ref 8–252)
HCT VFR BLD AUTO: 42.1 % (ref 35–47)
HGB BLD-MCNC: 14.2 G/DL (ref 11.5–16)

## 2023-05-19 PROCEDURE — 85018 HEMOGLOBIN: CPT

## 2023-05-19 PROCEDURE — 36415 COLL VENOUS BLD VENIPUNCTURE: CPT

## 2023-05-19 PROCEDURE — 82728 ASSAY OF FERRITIN: CPT

## 2023-05-19 PROCEDURE — 85014 HEMATOCRIT: CPT

## 2023-05-19 PROCEDURE — 99195 PHLEBOTOMY: CPT

## 2023-05-19 ASSESSMENT — PAIN SCALES - GENERAL: PAINLEVEL_OUTOF10: 0

## 2023-05-19 NOTE — PROGRESS NOTES
[facility] Peds/Adult Note                       Date: May 19, 2023    Name: Aster Colindres    MRN: [id number]         : 1960    1000 Patient arrives for [facility] Therapeutic Phlebotomy/labwithout acute problems. Please see [facility] for complete assessment and education provided. Vital signs stable throughout and prior to discharge. Patient tolerated procedure well and was discharged without incident. Patient is aware of next [facility] appointment on 2023. Appointment card give to the Patient. Ms. Jyotsna Boyd vitals were reviewed prior to and after treatment. Patient Vitals for the past 12 hrs:   Temp Pulse Resp BP   23 0945 97.9 °F (36.6 °C) 75 18 129/86         Lab results were obtained and reviewed. Recent Results (from the past 12 hour(s))   Hemoglobin and Hematocrit    Collection Time: 23  9:55 AM   Result Value Ref Range    Hemoglobin 14.2 11.5 - 16.0 g/dL    Hematocrit 42.1 35.0 - 47.0 %   Ferritin    Collection Time: 23  9:55 AM   Result Value Ref Range    Ferritin 54 8 - 252 NG/ML       Medications given:   Ms. Sybil Cabezas tolerated the infusion, and had no complaints. Ms. Sybil Cabezas was discharged from [place] [address] in stable condition.      Future Appointments   Date Time Provider Harrison White   2023  3:00 PM B4 PEDS [facility] [address] [place] [address]       Melany Merida RN  May 19, 2023  12:41 PM

## 2023-05-23 RX ORDER — SODIUM PHOSPHATE,MONO-DIBASIC 19G-7G/118
1 ENEMA (ML) RECTAL DAILY
COMMUNITY

## 2023-06-14 ENCOUNTER — HOSPITAL ENCOUNTER (OUTPATIENT)
Facility: HOSPITAL | Age: 63
Setting detail: INFUSION SERIES
Discharge: HOME | End: 2023-06-14
Payer: COMMERCIAL

## 2023-06-14 VITALS
TEMPERATURE: 97.7 F | RESPIRATION RATE: 18 BRPM | DIASTOLIC BLOOD PRESSURE: 88 MMHG | SYSTOLIC BLOOD PRESSURE: 133 MMHG | HEART RATE: 89 BPM

## 2023-06-14 DIAGNOSIS — E83.119 HEMOCHROMATOSIS, UNSPECIFIED HEMOCHROMATOSIS TYPE: Primary | ICD-10-CM

## 2023-06-14 LAB
BASOPHILS # BLD: 0 K/UL (ref 0–0.1)
BASOPHILS NFR BLD: 1 % (ref 0–1)
DIFFERENTIAL METHOD BLD: ABNORMAL
EOSINOPHIL # BLD: 0.1 K/UL (ref 0–0.4)
EOSINOPHIL NFR BLD: 2 % (ref 0–7)
ERYTHROCYTE [DISTWIDTH] IN BLOOD BY AUTOMATED COUNT: 12.8 % (ref 11.5–14.5)
FERRITIN SERPL-MCNC: 31 NG/ML (ref 8–252)
HCT VFR BLD AUTO: 41.6 % (ref 35–47)
HGB BLD-MCNC: 14.4 G/DL (ref 11.5–16)
IMM GRANULOCYTES # BLD AUTO: 0 K/UL (ref 0–0.04)
IMM GRANULOCYTES NFR BLD AUTO: 0 % (ref 0–0.5)
LYMPHOCYTES # BLD: 1.4 K/UL (ref 0.8–3.5)
LYMPHOCYTES NFR BLD: 29 % (ref 12–49)
MCH RBC QN AUTO: 34.9 PG (ref 26–34)
MCHC RBC AUTO-ENTMCNC: 34.6 G/DL (ref 30–36.5)
MCV RBC AUTO: 100.7 FL (ref 80–99)
MONOCYTES # BLD: 0.6 K/UL (ref 0–1)
MONOCYTES NFR BLD: 11 % (ref 5–13)
NEUTS SEG # BLD: 2.9 K/UL (ref 1.8–8)
NEUTS SEG NFR BLD: 57 % (ref 32–75)
NRBC # BLD: 0 K/UL (ref 0–0.01)
NRBC BLD-RTO: 0 PER 100 WBC
PLATELET # BLD AUTO: 239 K/UL (ref 150–400)
PMV BLD AUTO: 9.1 FL (ref 8.9–12.9)
RBC # BLD AUTO: 4.13 M/UL (ref 3.8–5.2)
WBC # BLD AUTO: 5 K/UL (ref 3.6–11)

## 2023-06-14 PROCEDURE — 82728 ASSAY OF FERRITIN: CPT

## 2023-06-14 PROCEDURE — 36415 COLL VENOUS BLD VENIPUNCTURE: CPT

## 2023-06-14 PROCEDURE — 85025 COMPLETE CBC W/AUTO DIFF WBC: CPT

## 2023-06-14 ASSESSMENT — PAIN SCALES - GENERAL: PAINLEVEL_OUTOF10: 0

## 2023-06-14 NOTE — PROGRESS NOTES
PEDI [facility] VISIT NOTE      8614 Patient arrives for Therapeutic Phlebotomy/lab without acute problems. Please see [facility] for complete assessment and education provided. Peripheral stick to Right Hand for lab draw; labs sent for processing. Vitals Signs:  Patient Vitals for the past 12 hrs:   Temp Pulse Resp BP   06/14/23 1505 97.7 °F (36.5 °C) 89 18 133/88     Labs:  Recent Results (from the past 12 hour(s))   CBC With Auto Differential    Collection Time: 06/14/23  3:14 PM   Result Value Ref Range    WBC 5.0 3.6 - 11.0 K/uL    RBC 4.13 3.80 - 5.20 M/uL    Hemoglobin 14.4 11.5 - 16.0 g/dL    Hematocrit 41.6 35.0 - 47.0 %    .7 (H) 80.0 - 99.0 FL    MCH 34.9 (H) 26.0 - 34.0 PG    MCHC 34.6 30.0 - 36.5 g/dL    RDW 12.8 11.5 - 14.5 %    Platelets 980 705 - 171 K/uL    MPV 9.1 8.9 - 12.9 FL    Nucleated RBCs 0.0 0  WBC    nRBC 0.00 0.00 - 0.01 K/uL    Neutrophils % 57 32 - 75 %    Lymphocytes % 29 12 - 49 %    Monocytes % 11 5 - 13 %    Eosinophils % 2 0 - 7 %    Basophils % 1 0 - 1 %    Immature Granulocytes 0 0.0 - 0.5 %    Neutrophils Absolute 2.9 1.8 - 8.0 K/UL    Lymphocytes Absolute 1.4 0.8 - 3.5 K/UL    Monocytes Absolute 0.6 0.0 - 1.0 K/UL    Eosinophils Absolute 0.1 0.0 - 0.4 K/UL    Basophils Absolute 0.0 0.0 - 0.1 K/UL    Absolute Immature Granulocyte 0.0 0.00 - 0.04 K/UL    Differential Type AUTOMATED     Ferritin    Collection Time: 06/14/23  3:14 PM   Result Value Ref Range    Ferritin 31 8 - 252 NG/ML     Patient's labs Not within parameters for treatment; therefore Therapeutic Phlebotomy Held @ this time. Vital signs stable , Patient tolerated lab draw well and discharged without incident. Patient is aware of next [facility] appointment 07/12/2023.

## 2023-07-12 ENCOUNTER — APPOINTMENT (OUTPATIENT)
Facility: HOSPITAL | Age: 63
End: 2023-07-12
Payer: COMMERCIAL

## 2023-07-13 ENCOUNTER — HOSPITAL ENCOUNTER (OUTPATIENT)
Facility: HOSPITAL | Age: 63
Setting detail: INFUSION SERIES
End: 2023-07-13
Payer: COMMERCIAL

## 2023-07-13 VITALS
TEMPERATURE: 97.8 F | HEART RATE: 93 BPM | RESPIRATION RATE: 18 BRPM | SYSTOLIC BLOOD PRESSURE: 113 MMHG | DIASTOLIC BLOOD PRESSURE: 81 MMHG

## 2023-07-13 DIAGNOSIS — E83.119 HEMOCHROMATOSIS, UNSPECIFIED HEMOCHROMATOSIS TYPE: ICD-10-CM

## 2023-07-13 LAB
BASOPHILS # BLD: 0 K/UL (ref 0–0.1)
BASOPHILS NFR BLD: 1 % (ref 0–1)
DIFFERENTIAL METHOD BLD: ABNORMAL
EOSINOPHIL # BLD: 0.1 K/UL (ref 0–0.4)
EOSINOPHIL NFR BLD: 2 % (ref 0–7)
ERYTHROCYTE [DISTWIDTH] IN BLOOD BY AUTOMATED COUNT: 12.7 % (ref 11.5–14.5)
FERRITIN SERPL-MCNC: 42 NG/ML (ref 26–388)
HCT VFR BLD AUTO: 43.8 % (ref 35–47)
HGB BLD-MCNC: 15.2 G/DL (ref 11.5–16)
IMM GRANULOCYTES # BLD AUTO: 0 K/UL (ref 0–0.04)
IMM GRANULOCYTES NFR BLD AUTO: 0 % (ref 0–0.5)
LYMPHOCYTES # BLD: 1.8 K/UL (ref 0.8–3.5)
LYMPHOCYTES NFR BLD: 30 % (ref 12–49)
MCH RBC QN AUTO: 35.1 PG (ref 26–34)
MCHC RBC AUTO-ENTMCNC: 34.7 G/DL (ref 30–36.5)
MCV RBC AUTO: 101.2 FL (ref 80–99)
MONOCYTES # BLD: 0.7 K/UL (ref 0–1)
MONOCYTES NFR BLD: 12 % (ref 5–13)
NEUTS SEG # BLD: 3.3 K/UL (ref 1.8–8)
NEUTS SEG NFR BLD: 55 % (ref 32–75)
NRBC # BLD: 0 K/UL (ref 0–0.01)
NRBC BLD-RTO: 0 PER 100 WBC
PLATELET # BLD AUTO: 264 K/UL (ref 150–400)
PMV BLD AUTO: 9.4 FL (ref 8.9–12.9)
RBC # BLD AUTO: 4.33 M/UL (ref 3.8–5.2)
WBC # BLD AUTO: 6 K/UL (ref 3.6–11)

## 2023-07-13 PROCEDURE — 82728 ASSAY OF FERRITIN: CPT

## 2023-07-13 PROCEDURE — 36415 COLL VENOUS BLD VENIPUNCTURE: CPT

## 2023-07-13 PROCEDURE — 85025 COMPLETE CBC W/AUTO DIFF WBC: CPT

## 2023-07-13 ASSESSMENT — PAIN SCALES - GENERAL: PAINLEVEL_OUTOF10: 0

## 2023-08-09 ENCOUNTER — HOSPITAL ENCOUNTER (OUTPATIENT)
Facility: HOSPITAL | Age: 63
Setting detail: INFUSION SERIES
End: 2023-08-09
Payer: COMMERCIAL

## 2023-08-09 VITALS
RESPIRATION RATE: 18 BRPM | TEMPERATURE: 97.7 F | HEART RATE: 98 BPM | DIASTOLIC BLOOD PRESSURE: 82 MMHG | SYSTOLIC BLOOD PRESSURE: 124 MMHG

## 2023-08-09 DIAGNOSIS — E83.119 HEMOCHROMATOSIS, UNSPECIFIED HEMOCHROMATOSIS TYPE: Primary | ICD-10-CM

## 2023-08-09 LAB
BASOPHILS # BLD: 0 K/UL (ref 0–0.1)
BASOPHILS NFR BLD: 1 % (ref 0–1)
DIFFERENTIAL METHOD BLD: ABNORMAL
EOSINOPHIL # BLD: 0.1 K/UL (ref 0–0.4)
EOSINOPHIL NFR BLD: 2 % (ref 0–7)
ERYTHROCYTE [DISTWIDTH] IN BLOOD BY AUTOMATED COUNT: 12.4 % (ref 11.5–14.5)
FERRITIN SERPL-MCNC: 75 NG/ML (ref 26–388)
HCT VFR BLD AUTO: 43.4 % (ref 35–47)
HGB BLD-MCNC: 15 G/DL (ref 11.5–16)
IMM GRANULOCYTES # BLD AUTO: 0 K/UL (ref 0–0.04)
IMM GRANULOCYTES NFR BLD AUTO: 0 % (ref 0–0.5)
LYMPHOCYTES # BLD: 1.8 K/UL (ref 0.8–3.5)
LYMPHOCYTES NFR BLD: 30 % (ref 12–49)
MCH RBC QN AUTO: 35.7 PG (ref 26–34)
MCHC RBC AUTO-ENTMCNC: 34.6 G/DL (ref 30–36.5)
MCV RBC AUTO: 103.3 FL (ref 80–99)
MONOCYTES # BLD: 0.7 K/UL (ref 0–1)
MONOCYTES NFR BLD: 11 % (ref 5–13)
NEUTS SEG # BLD: 3.5 K/UL (ref 1.8–8)
NEUTS SEG NFR BLD: 56 % (ref 32–75)
NRBC # BLD: 0 K/UL (ref 0–0.01)
NRBC BLD-RTO: 0 PER 100 WBC
PLATELET # BLD AUTO: 263 K/UL (ref 150–400)
PMV BLD AUTO: 9 FL (ref 8.9–12.9)
RBC # BLD AUTO: 4.2 M/UL (ref 3.8–5.2)
WBC # BLD AUTO: 6.2 K/UL (ref 3.6–11)

## 2023-08-09 PROCEDURE — 36415 COLL VENOUS BLD VENIPUNCTURE: CPT

## 2023-08-09 PROCEDURE — 85025 COMPLETE CBC W/AUTO DIFF WBC: CPT

## 2023-08-09 PROCEDURE — 82728 ASSAY OF FERRITIN: CPT

## 2023-08-09 PROCEDURE — 99195 PHLEBOTOMY: CPT

## 2023-08-09 ASSESSMENT — PAIN SCALES - GENERAL
PAINLEVEL_OUTOF10: 0
PAINLEVEL_OUTOF10: 0

## 2023-09-06 ENCOUNTER — HOSPITAL ENCOUNTER (OUTPATIENT)
Facility: HOSPITAL | Age: 63
Setting detail: INFUSION SERIES
End: 2023-09-06
Payer: COMMERCIAL

## 2023-09-06 VITALS
TEMPERATURE: 97.9 F | RESPIRATION RATE: 18 BRPM | DIASTOLIC BLOOD PRESSURE: 87 MMHG | HEART RATE: 102 BPM | SYSTOLIC BLOOD PRESSURE: 137 MMHG

## 2023-09-06 DIAGNOSIS — E83.119 HEMOCHROMATOSIS, UNSPECIFIED HEMOCHROMATOSIS TYPE: Primary | ICD-10-CM

## 2023-09-06 LAB
BASOPHILS # BLD: 0.1 K/UL (ref 0–0.1)
BASOPHILS NFR BLD: 0 % (ref 0–1)
DIFFERENTIAL METHOD BLD: ABNORMAL
EOSINOPHIL # BLD: 0.1 K/UL (ref 0–0.4)
EOSINOPHIL NFR BLD: 1 % (ref 0–7)
ERYTHROCYTE [DISTWIDTH] IN BLOOD BY AUTOMATED COUNT: 12.7 % (ref 11.5–14.5)
FERRITIN SERPL-MCNC: 40 NG/ML (ref 8–252)
HCT VFR BLD AUTO: 41.8 % (ref 35–47)
HGB BLD-MCNC: 14.5 G/DL (ref 11.5–16)
IMM GRANULOCYTES # BLD AUTO: 0 K/UL (ref 0–0.04)
IMM GRANULOCYTES NFR BLD AUTO: 0 % (ref 0–0.5)
LYMPHOCYTES # BLD: 1.6 K/UL (ref 0.8–3.5)
LYMPHOCYTES NFR BLD: 14 % (ref 12–49)
MCH RBC QN AUTO: 36.3 PG (ref 26–34)
MCHC RBC AUTO-ENTMCNC: 34.7 G/DL (ref 30–36.5)
MCV RBC AUTO: 104.5 FL (ref 80–99)
MONOCYTES # BLD: 1.1 K/UL (ref 0–1)
MONOCYTES NFR BLD: 10 % (ref 5–13)
NEUTS SEG # BLD: 8.4 K/UL (ref 1.8–8)
NEUTS SEG NFR BLD: 75 % (ref 32–75)
NRBC # BLD: 0 K/UL (ref 0–0.01)
NRBC BLD-RTO: 0 PER 100 WBC
PLATELET # BLD AUTO: 261 K/UL (ref 150–400)
PMV BLD AUTO: 9.2 FL (ref 8.9–12.9)
RBC # BLD AUTO: 4 M/UL (ref 3.8–5.2)
WBC # BLD AUTO: 11.2 K/UL (ref 3.6–11)

## 2023-09-06 PROCEDURE — 36415 COLL VENOUS BLD VENIPUNCTURE: CPT

## 2023-09-06 PROCEDURE — 85025 COMPLETE CBC W/AUTO DIFF WBC: CPT

## 2023-09-06 PROCEDURE — 82728 ASSAY OF FERRITIN: CPT

## 2023-09-06 ASSESSMENT — PAIN SCALES - GENERAL: PAINLEVEL_OUTOF10: 0

## 2023-10-04 ENCOUNTER — APPOINTMENT (OUTPATIENT)
Facility: HOSPITAL | Age: 63
End: 2023-10-04
Payer: COMMERCIAL

## 2023-10-04 ENCOUNTER — HOSPITAL ENCOUNTER (EMERGENCY)
Facility: HOSPITAL | Age: 63
Discharge: HOME | End: 2023-10-04
Attending: EMERGENCY MEDICINE
Payer: COMMERCIAL

## 2023-10-04 ENCOUNTER — HOSPITAL ENCOUNTER (OUTPATIENT)
Facility: HOSPITAL | Age: 63
Setting detail: INFUSION SERIES
End: 2023-10-04
Payer: COMMERCIAL

## 2023-10-04 VITALS
OXYGEN SATURATION: 98 % | SYSTOLIC BLOOD PRESSURE: 147 MMHG | TEMPERATURE: 97.8 F | HEART RATE: 122 BPM | DIASTOLIC BLOOD PRESSURE: 109 MMHG | RESPIRATION RATE: 18 BRPM

## 2023-10-04 VITALS
WEIGHT: 159.39 LBS | SYSTOLIC BLOOD PRESSURE: 165 MMHG | BODY MASS INDEX: 28.24 KG/M2 | DIASTOLIC BLOOD PRESSURE: 107 MMHG | TEMPERATURE: 97.5 F | RESPIRATION RATE: 18 BRPM | HEIGHT: 63 IN | HEART RATE: 118 BPM | OXYGEN SATURATION: 95 %

## 2023-10-04 DIAGNOSIS — F43.9 STRESS: ICD-10-CM

## 2023-10-04 DIAGNOSIS — R00.0 SINUS TACHYCARDIA: ICD-10-CM

## 2023-10-04 DIAGNOSIS — E83.119 HEMOCHROMATOSIS, UNSPECIFIED HEMOCHROMATOSIS TYPE: ICD-10-CM

## 2023-10-04 DIAGNOSIS — I10 ASYMPTOMATIC HYPERTENSION: Primary | ICD-10-CM

## 2023-10-04 LAB
ALBUMIN SERPL-MCNC: 3.7 G/DL (ref 3.5–5)
ALBUMIN/GLOB SERPL: 1 (ref 1.1–2.2)
ALP SERPL-CCNC: 122 U/L (ref 45–117)
ALT SERPL-CCNC: 28 U/L (ref 12–78)
ANION GAP SERPL CALC-SCNC: 9 MMOL/L (ref 5–15)
AST SERPL-CCNC: 30 U/L (ref 15–37)
BASOPHILS # BLD: 0 K/UL (ref 0–0.1)
BASOPHILS # BLD: 0 K/UL (ref 0–0.1)
BASOPHILS NFR BLD: 0 % (ref 0–1)
BASOPHILS NFR BLD: 1 % (ref 0–1)
BILIRUB SERPL-MCNC: 0.7 MG/DL (ref 0.2–1)
BUN SERPL-MCNC: 15 MG/DL (ref 6–20)
BUN/CREAT SERPL: 19 (ref 12–20)
CALCIUM SERPL-MCNC: 9.9 MG/DL (ref 8.5–10.1)
CHLORIDE SERPL-SCNC: 104 MMOL/L (ref 97–108)
CO2 SERPL-SCNC: 23 MMOL/L (ref 21–32)
COMMENT:: NORMAL
CREAT SERPL-MCNC: 0.77 MG/DL (ref 0.55–1.02)
DIFFERENTIAL METHOD BLD: ABNORMAL
DIFFERENTIAL METHOD BLD: ABNORMAL
EOSINOPHIL # BLD: 0.1 K/UL (ref 0–0.4)
EOSINOPHIL # BLD: 0.1 K/UL (ref 0–0.4)
EOSINOPHIL NFR BLD: 1 % (ref 0–7)
EOSINOPHIL NFR BLD: 2 % (ref 0–7)
ERYTHROCYTE [DISTWIDTH] IN BLOOD BY AUTOMATED COUNT: 12.1 % (ref 11.5–14.5)
ERYTHROCYTE [DISTWIDTH] IN BLOOD BY AUTOMATED COUNT: 12.2 % (ref 11.5–14.5)
FERRITIN SERPL-MCNC: 56 NG/ML (ref 26–388)
GLOBULIN SER CALC-MCNC: 3.6 G/DL (ref 2–4)
GLUCOSE SERPL-MCNC: 160 MG/DL (ref 65–100)
HCT VFR BLD AUTO: 43 % (ref 35–47)
HCT VFR BLD AUTO: 43 % (ref 35–47)
HGB BLD-MCNC: 15 G/DL (ref 11.5–16)
HGB BLD-MCNC: 15 G/DL (ref 11.5–16)
IMM GRANULOCYTES # BLD AUTO: 0 K/UL (ref 0–0.04)
IMM GRANULOCYTES # BLD AUTO: 0 K/UL (ref 0–0.04)
IMM GRANULOCYTES NFR BLD AUTO: 0 % (ref 0–0.5)
IMM GRANULOCYTES NFR BLD AUTO: 0 % (ref 0–0.5)
LYMPHOCYTES # BLD: 1.8 K/UL (ref 0.8–3.5)
LYMPHOCYTES # BLD: 1.8 K/UL (ref 0.8–3.5)
LYMPHOCYTES NFR BLD: 21 % (ref 12–49)
LYMPHOCYTES NFR BLD: 28 % (ref 12–49)
MAGNESIUM SERPL-MCNC: 1.7 MG/DL (ref 1.6–2.4)
MCH RBC QN AUTO: 35.6 PG (ref 26–34)
MCH RBC QN AUTO: 35.9 PG (ref 26–34)
MCHC RBC AUTO-ENTMCNC: 34.9 G/DL (ref 30–36.5)
MCHC RBC AUTO-ENTMCNC: 34.9 G/DL (ref 30–36.5)
MCV RBC AUTO: 102.1 FL (ref 80–99)
MCV RBC AUTO: 102.9 FL (ref 80–99)
MONOCYTES # BLD: 0.8 K/UL (ref 0–1)
MONOCYTES # BLD: 1.1 K/UL (ref 0–1)
MONOCYTES NFR BLD: 12 % (ref 5–13)
MONOCYTES NFR BLD: 12 % (ref 5–13)
NEUTS SEG # BLD: 3.7 K/UL (ref 1.8–8)
NEUTS SEG # BLD: 5.7 K/UL (ref 1.8–8)
NEUTS SEG NFR BLD: 58 % (ref 32–75)
NEUTS SEG NFR BLD: 65 % (ref 32–75)
NRBC # BLD: 0 K/UL (ref 0–0.01)
NRBC # BLD: 0 K/UL (ref 0–0.01)
NRBC BLD-RTO: 0 PER 100 WBC
NRBC BLD-RTO: 0 PER 100 WBC
PLATELET # BLD AUTO: 261 K/UL (ref 150–400)
PLATELET # BLD AUTO: 264 K/UL (ref 150–400)
PMV BLD AUTO: 9 FL (ref 8.9–12.9)
PMV BLD AUTO: 9 FL (ref 8.9–12.9)
POTASSIUM SERPL-SCNC: 4 MMOL/L (ref 3.5–5.1)
PROT SERPL-MCNC: 7.3 G/DL (ref 6.4–8.2)
RBC # BLD AUTO: 4.18 M/UL (ref 3.8–5.2)
RBC # BLD AUTO: 4.21 M/UL (ref 3.8–5.2)
SODIUM SERPL-SCNC: 136 MMOL/L (ref 136–145)
SPECIMEN HOLD: NORMAL
TROPONIN I SERPL HS-MCNC: 7 NG/L (ref 0–51)
WBC # BLD AUTO: 6.4 K/UL (ref 3.6–11)
WBC # BLD AUTO: 8.8 K/UL (ref 3.6–11)

## 2023-10-04 PROCEDURE — 36415 COLL VENOUS BLD VENIPUNCTURE: CPT

## 2023-10-04 PROCEDURE — 93005 ELECTROCARDIOGRAM TRACING: CPT | Performed by: STUDENT IN AN ORGANIZED HEALTH CARE EDUCATION/TRAINING PROGRAM

## 2023-10-04 PROCEDURE — 85025 COMPLETE CBC W/AUTO DIFF WBC: CPT

## 2023-10-04 PROCEDURE — 71046 X-RAY EXAM CHEST 2 VIEWS: CPT

## 2023-10-04 PROCEDURE — 83735 ASSAY OF MAGNESIUM: CPT

## 2023-10-04 PROCEDURE — 84484 ASSAY OF TROPONIN QUANT: CPT

## 2023-10-04 PROCEDURE — 80053 COMPREHEN METABOLIC PANEL: CPT

## 2023-10-04 PROCEDURE — 99195 PHLEBOTOMY: CPT

## 2023-10-04 PROCEDURE — 82728 ASSAY OF FERRITIN: CPT

## 2023-10-04 PROCEDURE — 99285 EMERGENCY DEPT VISIT HI MDM: CPT

## 2023-10-04 ASSESSMENT — PAIN SCALES - GENERAL
PAINLEVEL_OUTOF10: 0

## 2023-10-04 NOTE — ED TRIAGE NOTES
Pt ambulatory to triage for c/o elevated BP while getting labs done today. Pt states she had stressful day at work.  Denies pain or any other concerns

## 2023-10-04 NOTE — ED TRIAGE NOTES
59 yo female of hematomachrosis presents today witch complaints of elevated blood pressure. She was getting OP phlebotomy and BP was 694B systolic so they sent her to ED> She denies symptoms. No CP, dyspnea. No history of HTN. Reports particularly stressful day at work today. Tachy to 120 in triage. Reports palpitations when asked. Given tachycardia, will order cardiac workup. 5:48 PM  I have evaluated the patient as the Provider in Rapid Medical Evaluation (RME). I have reviewed her vital signs and the triage nurse assessment. I have talked with the patient and any available family and advised that I am the provider in triage and have ordered the appropriate study to initiate their work up based on the clinical presentation during my assessment. I have advised that the patient will be accommodated in the Main ED as soon as possible. I have also requested to contact the triage nurse or myself immediately if the patient experiences any changes in their condition during this brief waiting period.   Irma Mckeon PA-C

## 2023-10-05 LAB
EKG ATRIAL RATE: 110 BPM
EKG DIAGNOSIS: NORMAL
EKG P AXIS: 57 DEGREES
EKG P-R INTERVAL: 148 MS
EKG Q-T INTERVAL: 326 MS
EKG QRS DURATION: 76 MS
EKG QTC CALCULATION (BAZETT): 441 MS
EKG R AXIS: -70 DEGREES
EKG T AXIS: 11 DEGREES
EKG VENTRICULAR RATE: 110 BPM

## 2023-10-05 PROCEDURE — 93010 ELECTROCARDIOGRAM REPORT: CPT | Performed by: INTERNAL MEDICINE

## 2023-10-13 ENCOUNTER — APPOINTMENT (OUTPATIENT)
Facility: HOSPITAL | Age: 63
End: 2023-10-13
Payer: COMMERCIAL

## 2023-10-13 ENCOUNTER — HOSPITAL ENCOUNTER (EMERGENCY)
Facility: HOSPITAL | Age: 63
Discharge: HOME | End: 2023-10-13
Attending: EMERGENCY MEDICINE
Payer: COMMERCIAL

## 2023-10-13 VITALS
SYSTOLIC BLOOD PRESSURE: 155 MMHG | OXYGEN SATURATION: 97 % | HEART RATE: 97 BPM | RESPIRATION RATE: 16 BRPM | BODY MASS INDEX: 27.88 KG/M2 | DIASTOLIC BLOOD PRESSURE: 99 MMHG | TEMPERATURE: 97.7 F | WEIGHT: 157.41 LBS

## 2023-10-13 DIAGNOSIS — I10 ESSENTIAL HYPERTENSION: ICD-10-CM

## 2023-10-13 DIAGNOSIS — R47.1 DYSARTHRIA: Primary | ICD-10-CM

## 2023-10-13 LAB
ALBUMIN SERPL-MCNC: 3.9 G/DL (ref 3.5–5)
ALBUMIN/GLOB SERPL: 1 (ref 1.1–2.2)
ALP SERPL-CCNC: 104 U/L (ref 45–117)
ALT SERPL-CCNC: 26 U/L (ref 12–78)
ANION GAP SERPL CALC-SCNC: 6 MMOL/L (ref 5–15)
AST SERPL-CCNC: 24 U/L (ref 15–37)
BASOPHILS # BLD: 0 K/UL (ref 0–0.1)
BASOPHILS NFR BLD: 1 % (ref 0–1)
BILIRUB SERPL-MCNC: 0.4 MG/DL (ref 0.2–1)
BUN SERPL-MCNC: 11 MG/DL (ref 6–20)
BUN/CREAT SERPL: 16 (ref 12–20)
CALCIUM SERPL-MCNC: 10.1 MG/DL (ref 8.5–10.1)
CHLORIDE SERPL-SCNC: 107 MMOL/L (ref 97–108)
CO2 SERPL-SCNC: 25 MMOL/L (ref 21–32)
COMMENT:: NORMAL
CREAT SERPL-MCNC: 0.67 MG/DL (ref 0.55–1.02)
DIFFERENTIAL METHOD BLD: ABNORMAL
EOSINOPHIL # BLD: 0.2 K/UL (ref 0–0.4)
EOSINOPHIL NFR BLD: 3 % (ref 0–7)
ERYTHROCYTE [DISTWIDTH] IN BLOOD BY AUTOMATED COUNT: 12.4 % (ref 11.5–14.5)
GLOBULIN SER CALC-MCNC: 3.8 G/DL (ref 2–4)
GLUCOSE SERPL-MCNC: 107 MG/DL (ref 65–100)
HCT VFR BLD AUTO: 41.6 % (ref 35–47)
HGB BLD-MCNC: 14.6 G/DL (ref 11.5–16)
IMM GRANULOCYTES # BLD AUTO: 0 K/UL (ref 0–0.04)
IMM GRANULOCYTES NFR BLD AUTO: 0 % (ref 0–0.5)
LYMPHOCYTES # BLD: 2 K/UL (ref 0.8–3.5)
LYMPHOCYTES NFR BLD: 31 % (ref 12–49)
MCH RBC QN AUTO: 36.4 PG (ref 26–34)
MCHC RBC AUTO-ENTMCNC: 35.1 G/DL (ref 30–36.5)
MCV RBC AUTO: 103.7 FL (ref 80–99)
MONOCYTES # BLD: 0.8 K/UL (ref 0–1)
MONOCYTES NFR BLD: 13 % (ref 5–13)
NEUTS SEG # BLD: 3.3 K/UL (ref 1.8–8)
NEUTS SEG NFR BLD: 52 % (ref 32–75)
NRBC # BLD: 0 K/UL (ref 0–0.01)
NRBC BLD-RTO: 0 PER 100 WBC
PLATELET # BLD AUTO: 323 K/UL (ref 150–400)
PMV BLD AUTO: 9.2 FL (ref 8.9–12.9)
POTASSIUM SERPL-SCNC: 4.2 MMOL/L (ref 3.5–5.1)
PROT SERPL-MCNC: 7.7 G/DL (ref 6.4–8.2)
RBC # BLD AUTO: 4.01 M/UL (ref 3.8–5.2)
SODIUM SERPL-SCNC: 138 MMOL/L (ref 136–145)
SPECIMEN HOLD: NORMAL
TSH SERPL DL<=0.05 MIU/L-ACNC: 1.22 UIU/ML (ref 0.36–3.74)
WBC # BLD AUTO: 6.3 K/UL (ref 3.6–11)

## 2023-10-13 PROCEDURE — 70450 CT HEAD/BRAIN W/O DYE: CPT

## 2023-10-13 PROCEDURE — 71046 X-RAY EXAM CHEST 2 VIEWS: CPT

## 2023-10-13 PROCEDURE — 85025 COMPLETE CBC W/AUTO DIFF WBC: CPT

## 2023-10-13 PROCEDURE — 84443 ASSAY THYROID STIM HORMONE: CPT

## 2023-10-13 PROCEDURE — 99285 EMERGENCY DEPT VISIT HI MDM: CPT

## 2023-10-13 PROCEDURE — 36415 COLL VENOUS BLD VENIPUNCTURE: CPT

## 2023-10-13 PROCEDURE — 80053 COMPREHEN METABOLIC PANEL: CPT

## 2023-10-13 ASSESSMENT — ENCOUNTER SYMPTOMS
DIARRHEA: 0
COUGH: 0
BACK PAIN: 0
CHEST TIGHTNESS: 0
SHORTNESS OF BREATH: 0
ABDOMINAL PAIN: 0
NAUSEA: 0
SORE THROAT: 0
EYE DISCHARGE: 0
EYE PAIN: 0
FACIAL SWELLING: 0
VOMITING: 0

## 2023-10-13 NOTE — ED NOTES
Patient discharged. RN reviewed discharge instructions. Patient verbalized understanding.       Neha Hannah RN  10/13/23 1941

## 2023-10-13 NOTE — ED PROVIDER NOTES
[facility] EMERGENCY DEP  EMERGENCY DEPARTMENT ENCOUNTER      Pt Name: Luana Helton  MRN: [id number]  [address] [facility] [address] 1960  Date of evaluation: 10/13/2023  Provider: Corby Mills MD    [address]       Chief Complaint   Patient presents with    Aphasia         HISTORY OF PRESENT ILLNESS   (Location/Symptom, Timing/Onset, Context/Setting, Quality, Duration, Modifying Factors, Severity)  Note limiting factors. 69-year-old female with speech difficulty off and on for the past 2 weeks. Also hypertension with systolic maximum pressures 1 60-1 70. She told this to her primary physician who recommended she come to the ER for evaluation with CAT scan and blood testing today. Patient has no symptoms today. The history is provided by the patient. Neurologic Problem  Primary symptoms include no focal weakness, no loss of sensation, no loss of balance, no dizziness. This is a new problem. The current episode started more than 1 week ago. The problem has been resolved. There has been no fever. Pertinent negatives include no shortness of breath, no chest pain, no vomiting, no altered mental status, no confusion and no headaches. Review of External Medical Records:     Nursing Notes were reviewed. REVIEW OF SYSTEMS    (2-9 systems for level 4, 10 or more for level 5)     Review of Systems   Constitutional:  Negative for activity change, appetite change, chills and diaphoresis. HENT:  Negative for congestion, ear pain, facial swelling and sore throat. Eyes:  Negative for pain, discharge and visual disturbance. Respiratory:  Negative for cough, chest tightness and shortness of breath. Cardiovascular:  Negative for chest pain and palpitations. Gastrointestinal:  Negative for abdominal pain, diarrhea, nausea and vomiting. Endocrine: Negative for cold intolerance, heat intolerance, polydipsia and polyphagia.    Genitourinary:  Negative for difficulty urinating, dysuria, frequency, hematuria,

## 2023-10-13 NOTE — ED TRIAGE NOTES
Pt here for for asphasia x one week, +headache, +dizziness, no blood thinners , no head trauma, denies double vision , +left arm numbness x one week, denies facial  or leg numbness

## 2023-11-01 ENCOUNTER — HOSPITAL ENCOUNTER (OUTPATIENT)
Facility: HOSPITAL | Age: 63
Setting detail: INFUSION SERIES
Discharge: HOME | End: 2023-11-01
Payer: COMMERCIAL

## 2023-11-01 VITALS
RESPIRATION RATE: 18 BRPM | OXYGEN SATURATION: 97 % | SYSTOLIC BLOOD PRESSURE: 141 MMHG | TEMPERATURE: 97.9 F | HEART RATE: 97 BPM | DIASTOLIC BLOOD PRESSURE: 84 MMHG

## 2023-11-01 DIAGNOSIS — E83.119 HEMOCHROMATOSIS, UNSPECIFIED HEMOCHROMATOSIS TYPE: ICD-10-CM

## 2023-11-01 LAB
BASOPHILS # BLD: 0 K/UL (ref 0–0.1)
BASOPHILS NFR BLD: 1 % (ref 0–1)
DIFFERENTIAL METHOD BLD: ABNORMAL
EOSINOPHIL # BLD: 0.1 K/UL (ref 0–0.4)
EOSINOPHIL NFR BLD: 1 % (ref 0–7)
ERYTHROCYTE [DISTWIDTH] IN BLOOD BY AUTOMATED COUNT: 11.7 % (ref 11.5–14.5)
FERRITIN SERPL-MCNC: 31 NG/ML (ref 8–252)
HCT VFR BLD AUTO: 42.9 % (ref 35–47)
HGB BLD-MCNC: 14.8 G/DL (ref 11.5–16)
IMM GRANULOCYTES # BLD AUTO: 0 K/UL (ref 0–0.04)
IMM GRANULOCYTES NFR BLD AUTO: 0 % (ref 0–0.5)
LYMPHOCYTES # BLD: 2 K/UL (ref 0.8–3.5)
LYMPHOCYTES NFR BLD: 28 % (ref 12–49)
MCH RBC QN AUTO: 35.9 PG (ref 26–34)
MCHC RBC AUTO-ENTMCNC: 34.5 G/DL (ref 30–36.5)
MCV RBC AUTO: 104.1 FL (ref 80–99)
MONOCYTES # BLD: 0.9 K/UL (ref 0–1)
MONOCYTES NFR BLD: 12 % (ref 5–13)
NEUTS SEG # BLD: 4.1 K/UL (ref 1.8–8)
NEUTS SEG NFR BLD: 58 % (ref 32–75)
NRBC # BLD: 0 K/UL (ref 0–0.01)
NRBC BLD-RTO: 0 PER 100 WBC
PLATELET # BLD AUTO: 273 K/UL (ref 150–400)
PMV BLD AUTO: 9.1 FL (ref 8.9–12.9)
RBC # BLD AUTO: 4.12 M/UL (ref 3.8–5.2)
WBC # BLD AUTO: 7 K/UL (ref 3.6–11)

## 2023-11-01 PROCEDURE — 85025 COMPLETE CBC W/AUTO DIFF WBC: CPT

## 2023-11-01 PROCEDURE — 36415 COLL VENOUS BLD VENIPUNCTURE: CPT

## 2023-11-01 PROCEDURE — 82728 ASSAY OF FERRITIN: CPT

## 2023-11-01 ASSESSMENT — PAIN SCALES - GENERAL: PAINLEVEL_OUTOF10: 0

## 2023-11-29 ENCOUNTER — HOSPITAL ENCOUNTER (OUTPATIENT)
Facility: HOSPITAL | Age: 63
Setting detail: INFUSION SERIES
Discharge: HOME | End: 2023-11-29
Payer: COMMERCIAL

## 2023-11-29 VITALS
SYSTOLIC BLOOD PRESSURE: 132 MMHG | DIASTOLIC BLOOD PRESSURE: 89 MMHG | HEART RATE: 97 BPM | TEMPERATURE: 97.8 F | RESPIRATION RATE: 18 BRPM

## 2023-11-29 DIAGNOSIS — E83.119 HEMOCHROMATOSIS, UNSPECIFIED HEMOCHROMATOSIS TYPE: Primary | ICD-10-CM

## 2023-11-29 LAB
BASOPHILS # BLD: 0 K/UL (ref 0–0.1)
BASOPHILS NFR BLD: 1 % (ref 0–1)
DIFFERENTIAL METHOD BLD: ABNORMAL
EOSINOPHIL # BLD: 0.1 K/UL (ref 0–0.4)
EOSINOPHIL NFR BLD: 1 % (ref 0–7)
ERYTHROCYTE [DISTWIDTH] IN BLOOD BY AUTOMATED COUNT: 11.5 % (ref 11.5–14.5)
FERRITIN SERPL-MCNC: 37 NG/ML (ref 26–388)
HCT VFR BLD AUTO: 45 % (ref 35–47)
HGB BLD-MCNC: 15.6 G/DL (ref 11.5–16)
IMM GRANULOCYTES # BLD AUTO: 0 K/UL (ref 0–0.04)
IMM GRANULOCYTES NFR BLD AUTO: 0 % (ref 0–0.5)
LYMPHOCYTES # BLD: 1.5 K/UL (ref 0.8–3.5)
LYMPHOCYTES NFR BLD: 24 % (ref 12–49)
MCH RBC QN AUTO: 34.8 PG (ref 26–34)
MCHC RBC AUTO-ENTMCNC: 34.7 G/DL (ref 30–36.5)
MCV RBC AUTO: 100.4 FL (ref 80–99)
MONOCYTES # BLD: 0.8 K/UL (ref 0–1)
MONOCYTES NFR BLD: 13 % (ref 5–13)
NEUTS SEG # BLD: 3.9 K/UL (ref 1.8–8)
NEUTS SEG NFR BLD: 61 % (ref 32–75)
NRBC # BLD: 0 K/UL (ref 0–0.01)
NRBC BLD-RTO: 0 PER 100 WBC
PLATELET # BLD AUTO: 270 K/UL (ref 150–400)
PMV BLD AUTO: 9.1 FL (ref 8.9–12.9)
RBC # BLD AUTO: 4.48 M/UL (ref 3.8–5.2)
WBC # BLD AUTO: 6.4 K/UL (ref 3.6–11)

## 2023-11-29 PROCEDURE — 36415 COLL VENOUS BLD VENIPUNCTURE: CPT

## 2023-11-29 PROCEDURE — 82728 ASSAY OF FERRITIN: CPT

## 2023-11-29 PROCEDURE — 85025 COMPLETE CBC W/AUTO DIFF WBC: CPT

## 2023-11-29 RX ORDER — AMLODIPINE BESYLATE 5 MG/1
5 TABLET ORAL DAILY
Qty: 30 TABLET | Refills: 11 | COMMUNITY
Start: 2023-11-29 | End: 2024-11-23

## 2023-11-29 ASSESSMENT — PAIN SCALES - GENERAL: PAINLEVEL_OUTOF10: 0

## 2023-12-27 ENCOUNTER — HOSPITAL ENCOUNTER (OUTPATIENT)
Facility: HOSPITAL | Age: 63
Setting detail: INFUSION SERIES
Discharge: HOME | End: 2023-12-27
Payer: COMMERCIAL

## 2023-12-27 VITALS
HEART RATE: 84 BPM | RESPIRATION RATE: 18 BRPM | SYSTOLIC BLOOD PRESSURE: 120 MMHG | DIASTOLIC BLOOD PRESSURE: 78 MMHG | TEMPERATURE: 98 F

## 2023-12-27 DIAGNOSIS — E83.119 HEMOCHROMATOSIS, UNSPECIFIED HEMOCHROMATOSIS TYPE: Primary | ICD-10-CM

## 2023-12-27 LAB
BASOPHILS # BLD: 0.1 K/UL (ref 0–0.1)
BASOPHILS NFR BLD: 1 % (ref 0–1)
DIFFERENTIAL METHOD BLD: ABNORMAL
EOSINOPHIL # BLD: 0.2 K/UL (ref 0–0.4)
EOSINOPHIL NFR BLD: 2 % (ref 0–7)
ERYTHROCYTE [DISTWIDTH] IN BLOOD BY AUTOMATED COUNT: 11.9 % (ref 11.5–14.5)
FERRITIN SERPL-MCNC: 57 NG/ML (ref 26–388)
HCT VFR BLD AUTO: 40.9 % (ref 35–47)
HGB BLD-MCNC: 14.1 G/DL (ref 11.5–16)
IMM GRANULOCYTES # BLD AUTO: 0 K/UL
IMM GRANULOCYTES NFR BLD AUTO: 0 %
LYMPHOCYTES # BLD: 1.7 K/UL (ref 0.8–3.5)
LYMPHOCYTES NFR BLD: 23 % (ref 12–49)
MCH RBC QN AUTO: 35.3 PG (ref 26–34)
MCHC RBC AUTO-ENTMCNC: 34.5 G/DL (ref 30–36.5)
MCV RBC AUTO: 102.3 FL (ref 80–99)
MONOCYTES # BLD: 1.1 K/UL (ref 0–1)
MONOCYTES NFR BLD: 14 % (ref 5–13)
NEUTS SEG # BLD: 4.5 K/UL (ref 1.8–8)
NEUTS SEG NFR BLD: 60 % (ref 32–75)
NRBC # BLD: 0 K/UL (ref 0–0.01)
NRBC BLD-RTO: 0 PER 100 WBC
PLATELET # BLD AUTO: 311 K/UL (ref 150–400)
PMV BLD AUTO: 9.2 FL (ref 8.9–12.9)
RBC # BLD AUTO: 4 M/UL (ref 3.8–5.2)
RBC MORPH BLD: ABNORMAL
WBC # BLD AUTO: 7.6 K/UL (ref 3.6–11)
WBC MORPH BLD: ABNORMAL

## 2023-12-27 PROCEDURE — 36415 COLL VENOUS BLD VENIPUNCTURE: CPT

## 2023-12-27 PROCEDURE — 85025 COMPLETE CBC W/AUTO DIFF WBC: CPT

## 2023-12-27 PROCEDURE — 99195 PHLEBOTOMY: CPT

## 2023-12-27 PROCEDURE — 82728 ASSAY OF FERRITIN: CPT

## 2023-12-27 ASSESSMENT — PAIN SCALES - GENERAL: PAINLEVEL_OUTOF10: 0

## 2024-01-24 ENCOUNTER — APPOINTMENT (OUTPATIENT)
Facility: HOSPITAL | Age: 64
End: 2024-01-24
Payer: COMMERCIAL

## 2024-01-30 ENCOUNTER — HOSPITAL ENCOUNTER (OUTPATIENT)
Facility: HOSPITAL | Age: 64
Setting detail: INFUSION SERIES
Discharge: HOME | End: 2024-01-30
Payer: COMMERCIAL

## 2024-01-30 VITALS
SYSTOLIC BLOOD PRESSURE: 120 MMHG | RESPIRATION RATE: 18 BRPM | TEMPERATURE: 97.8 F | DIASTOLIC BLOOD PRESSURE: 76 MMHG | HEART RATE: 80 BPM

## 2024-01-30 DIAGNOSIS — E83.119 HEMOCHROMATOSIS, UNSPECIFIED HEMOCHROMATOSIS TYPE: Primary | ICD-10-CM

## 2024-01-30 LAB
FERRITIN SERPL-MCNC: 18 NG/ML (ref 8–252)
HGB BLD-MCNC: 14.6 G/DL (ref 11.5–16)

## 2024-01-30 PROCEDURE — 36415 COLL VENOUS BLD VENIPUNCTURE: CPT

## 2024-01-30 PROCEDURE — 85018 HEMOGLOBIN: CPT

## 2024-01-30 PROCEDURE — 82728 ASSAY OF FERRITIN: CPT

## 2024-01-30 ASSESSMENT — PAIN SCALES - GENERAL: PAINLEVEL_OUTOF10: 0

## 2024-01-30 NOTE — PROGRESS NOTES
PEDI Loc[facility] VISIT NOTE      1000 Patient arrives for Therapeutic Phlebotomy/lab without acute problems. Please see [facility] for complete assessment and education provided.      Peripheral stick to R hand for lab draw; labs sent for processing.      Ms. Armstrong's vitals were reviewed prior to and after treatment.   Patient Vitals for the past 12 hrs:   Temp Pulse Resp BP   01/30/24 0945 97.8 °F (36.6 °C) 85 18 134/79        Lab results were obtained and reviewed.  Recent Results (from the past 12 hour(s))   Ferritin    Collection Time: 01/30/24  9:56 AM   Result Value Ref Range    Ferritin 18 8 - 252 NG/ML   POCT hemoglobin    Collection Time: 01/30/24  9:59 AM   Result Value Ref Range    POC Hemoglobin 14.6 11.5 - 16.0 g/dL       Patient's labs not within parameters for treatment today.      Vital signs stable throughout and prior to discharge. Patient tolerated procedure well and was discharged without incident.  Patient is aware of next [facility] appointment on 2/28/2024.  Appointment card give to the Patient.     Future Appointments   Date Time Provider Department Center   2/28/2024  3:00 PM JONO DR[facility]       EDD RODNEY RN  January 30, 2024  11:33 AM

## 2024-02-21 ENCOUNTER — APPOINTMENT (OUTPATIENT)
Facility: HOSPITAL | Age: 64
End: 2024-02-21
Payer: COMMERCIAL

## 2024-02-28 ENCOUNTER — HOSPITAL ENCOUNTER (OUTPATIENT)
Facility: HOSPITAL | Age: 64
Setting detail: INFUSION SERIES
Discharge: HOME | End: 2024-02-28
Payer: COMMERCIAL

## 2024-02-28 VITALS
DIASTOLIC BLOOD PRESSURE: 81 MMHG | SYSTOLIC BLOOD PRESSURE: 116 MMHG | HEART RATE: 84 BPM | TEMPERATURE: 97.5 F | RESPIRATION RATE: 18 BRPM

## 2024-02-28 DIAGNOSIS — E83.119 HEMOCHROMATOSIS, UNSPECIFIED HEMOCHROMATOSIS TYPE: Primary | ICD-10-CM

## 2024-02-28 LAB
FERRITIN SERPL-MCNC: 22 NG/ML (ref 26–388)
HGB BLD-MCNC: 14.9 G/DL (ref 11.5–16)

## 2024-02-28 PROCEDURE — 36415 COLL VENOUS BLD VENIPUNCTURE: CPT

## 2024-02-28 PROCEDURE — 82728 ASSAY OF FERRITIN: CPT

## 2024-02-28 PROCEDURE — 85018 HEMOGLOBIN: CPT

## 2024-02-28 ASSESSMENT — PAIN SCALES - GENERAL: PAINLEVEL_OUTOF10: 0

## 2024-02-28 NOTE — PROGRESS NOTES
PEDI [facility] VISIT NOTE      1450 Patient arrives for Therapeutic Phlebotomy/lab without acute problems. Please see [facility] for complete assessment and education provided.      Peripheral stick to right hand for lab draw; labs sent for processing.      Vitals Signs:  Patient Vitals for the past 12 hrs:   Temp Pulse Resp BP   02/28/24 1450 97.5 °F (36.4 °C) 84 18 116/81     Labs:  Recent Results (from the past 12 hour(s))   Ferritin    Collection Time: 02/28/24  3:00 PM   Result Value Ref Range    Ferritin 22 (L) 26 - 388 NG/ML   POCT hemoglobin    Collection Time: 02/28/24  3:02 PM   Result Value Ref Range    POC Hemoglobin 14.9 11.5 - 16.0 g/dL     Patient's labs Not within parameters for treatment; therefore therapeutic phlebotomy held @ this time.      Vital signs stable,  Patient tolerated lab draw well and discharged without incident. Patient is aware of next [facility] appointment 03/27/2024.

## 2024-03-20 ENCOUNTER — APPOINTMENT (OUTPATIENT)
Facility: HOSPITAL | Age: 64
End: 2024-03-20
Payer: COMMERCIAL

## 2024-03-27 ENCOUNTER — HOSPITAL ENCOUNTER (OUTPATIENT)
Facility: HOSPITAL | Age: 64
Setting detail: INFUSION SERIES
Discharge: HOME | End: 2024-03-27
Payer: COMMERCIAL

## 2024-03-27 VITALS
HEART RATE: 86 BPM | DIASTOLIC BLOOD PRESSURE: 86 MMHG | SYSTOLIC BLOOD PRESSURE: 119 MMHG | TEMPERATURE: 98.1 F | RESPIRATION RATE: 18 BRPM

## 2024-03-27 DIAGNOSIS — E83.110 HEREDITARY HEMOCHROMATOSIS (HCC): Primary | ICD-10-CM

## 2024-03-27 LAB
BASOPHILS # BLD: 0 K/UL (ref 0–0.1)
BASOPHILS NFR BLD: 0 % (ref 0–1)
DIFFERENTIAL METHOD BLD: ABNORMAL
EOSINOPHIL # BLD: 0.1 K/UL (ref 0–0.4)
EOSINOPHIL NFR BLD: 2 % (ref 0–7)
ERYTHROCYTE [DISTWIDTH] IN BLOOD BY AUTOMATED COUNT: 12 % (ref 11.5–14.5)
FERRITIN SERPL-MCNC: 17 NG/ML (ref 26–388)
HCT VFR BLD AUTO: 44.2 % (ref 35–47)
HGB BLD-MCNC: 15.2 G/DL (ref 11.5–16)
IMM GRANULOCYTES # BLD AUTO: 0 K/UL (ref 0–0.04)
IMM GRANULOCYTES NFR BLD AUTO: 0 % (ref 0–0.5)
LYMPHOCYTES # BLD: 1.7 K/UL (ref 0.8–3.5)
LYMPHOCYTES NFR BLD: 26 % (ref 12–49)
MCH RBC QN AUTO: 33.5 PG (ref 26–34)
MCHC RBC AUTO-ENTMCNC: 34.4 G/DL (ref 30–36.5)
MCV RBC AUTO: 97.4 FL (ref 80–99)
MONOCYTES # BLD: 0.9 K/UL (ref 0–1)
MONOCYTES NFR BLD: 14 % (ref 5–13)
NEUTS SEG # BLD: 3.8 K/UL (ref 1.8–8)
NEUTS SEG NFR BLD: 58 % (ref 32–75)
NRBC # BLD: 0 K/UL (ref 0–0.01)
NRBC BLD-RTO: 0 PER 100 WBC
PLATELET # BLD AUTO: 283 K/UL (ref 150–400)
PMV BLD AUTO: 9.6 FL (ref 8.9–12.9)
RBC # BLD AUTO: 4.54 M/UL (ref 3.8–5.2)
WBC # BLD AUTO: 6.6 K/UL (ref 3.6–11)

## 2024-03-27 PROCEDURE — 36415 COLL VENOUS BLD VENIPUNCTURE: CPT

## 2024-03-27 PROCEDURE — 82728 ASSAY OF FERRITIN: CPT

## 2024-03-27 PROCEDURE — 85025 COMPLETE CBC W/AUTO DIFF WBC: CPT

## 2024-03-27 ASSESSMENT — PAIN SCALES - GENERAL: PAINLEVEL_OUTOF10: 0

## 2024-03-27 NOTE — PROGRESS NOTES
PEDI [facility] VISIT NOTE      0800 Patient arrives for Therapeutic Phlebotomy/lab without acute problems. Please see [facility] for complete assessment and education provided.      Peripheral stick to right hand for lab draw; labs sent for processing.      Vitals Signs:  Patient Vitals for the past 12 hrs:   Temp Pulse Resp BP   03/27/24 0800 98.1 °F (36.7 °C) 86 18 119/86     Labs:  Recent Results (from the past 12 hour(s))   CBC With Auto Differential    Collection Time: 03/27/24  8:10 AM   Result Value Ref Range    WBC 6.6 3.6 - 11.0 K/uL    RBC 4.54 3.80 - 5.20 M/uL    Hemoglobin 15.2 11.5 - 16.0 g/dL    Hematocrit 44.2 35.0 - 47.0 %    MCV 97.4 80.0 - 99.0 FL    MCH 33.5 26.0 - 34.0 PG    MCHC 34.4 30.0 - 36.5 g/dL    RDW 12.0 11.5 - 14.5 %    Platelets 283 150 - 400 K/uL    MPV 9.6 8.9 - 12.9 FL    Nucleated RBCs 0.0 0  WBC    nRBC 0.00 0.00 - 0.01 K/uL    Neutrophils % 58 32 - 75 %    Lymphocytes % 26 12 - 49 %    Monocytes % 14 (H) 5 - 13 %    Eosinophils % 2 0 - 7 %    Basophils % 0 0 - 1 %    Immature Granulocytes 0 0.0 - 0.5 %    Neutrophils Absolute 3.8 1.8 - 8.0 K/UL    Lymphocytes Absolute 1.7 0.8 - 3.5 K/UL    Monocytes Absolute 0.9 0.0 - 1.0 K/UL    Eosinophils Absolute 0.1 0.0 - 0.4 K/UL    Basophils Absolute 0.0 0.0 - 0.1 K/UL    Absolute Immature Granulocyte 0.0 0.00 - 0.04 K/UL    Differential Type AUTOMATED     Ferritin    Collection Time: 03/27/24  8:10 AM   Result Value Ref Range    Ferritin 17 (L) 26 - 388 NG/ML     Patient's labs Not within parameters for treatment; therefore therapeutic phlebotomy HELD @ this time.     Vital signs stable , Patient tolerated lab draw well and discharged without incident. Patient is aware of next [facility] appointment 04/24/2024.

## 2024-04-17 ENCOUNTER — APPOINTMENT (OUTPATIENT)
Facility: HOSPITAL | Age: 64
End: 2024-04-17
Payer: COMMERCIAL

## 2024-04-24 ENCOUNTER — HOSPITAL ENCOUNTER (OUTPATIENT)
Facility: HOSPITAL | Age: 64
Setting detail: INFUSION SERIES
Discharge: HOME | End: 2024-04-24
Payer: COMMERCIAL

## 2024-04-24 VITALS
HEART RATE: 83 BPM | TEMPERATURE: 97.3 F | SYSTOLIC BLOOD PRESSURE: 112 MMHG | RESPIRATION RATE: 18 BRPM | DIASTOLIC BLOOD PRESSURE: 76 MMHG

## 2024-04-24 DIAGNOSIS — E83.110 HEREDITARY HEMOCHROMATOSIS (HCC): Primary | ICD-10-CM

## 2024-04-24 LAB
FERRITIN SERPL-MCNC: 23 NG/ML (ref 8–252)
HGB BLD-MCNC: 15.5 G/DL (ref 11.5–16)

## 2024-04-24 PROCEDURE — 36415 COLL VENOUS BLD VENIPUNCTURE: CPT

## 2024-04-24 PROCEDURE — 82728 ASSAY OF FERRITIN: CPT

## 2024-04-24 PROCEDURE — 85018 HEMOGLOBIN: CPT

## 2024-04-24 ASSESSMENT — PAIN SCALES - GENERAL: PAINLEVEL_OUTOF10: 0

## 2024-04-24 NOTE — PROGRESS NOTES
PEDI [facility] VISIT NOTE      1500 Patient arrives for Therapeutic Phlebotomy/lab without acute problems. Please see [facility] for complete assessment and education provided.      Peripheral stick to right hand for lab draw; labs sent for processing.      Ms. Armstrong's vitals were reviewed prior to and after treatment.   Patient Vitals for the past 12 hrs:   Temp Pulse Resp BP   04/24/24 1500 97.3 °F (36.3 °C) 83 18 112/76        Lab results were obtained and reviewed.  Recent Results (from the past 12 hour(s))   Ferritin    Collection Time: 04/24/24  3:19 PM   Result Value Ref Range    Ferritin 23 8 - 252 NG/ML   POCT hemoglobin    Collection Time: 04/24/24  3:22 PM   Result Value Ref Range    POC Hemoglobin 15.5 11.5 - 16.0 g/dL       Patient's labs NOT within parameters for treatment.       Vital signs stable throughout and prior to discharge. Patient tolerated procedure well and was discharged without incident.  Patient is aware of next [facility] appointment on 5/22/2024. Appointment card give to the patient.     Future Appointments   Date Time Provider Department Center   5/22/2024  3:00 PM PEDS FASTTRACK 2 [facility]   6/19/2024  3:00 PM PEDS FASTTRACK 2 [facility]       LUIS ANGEL BONILLA RN  April 24, 2024  4:25 PM

## 2024-05-22 ENCOUNTER — HOSPITAL ENCOUNTER (OUTPATIENT)
Facility: HOSPITAL | Age: 64
Setting detail: INFUSION SERIES
Discharge: HOME | End: 2024-05-22
Payer: COMMERCIAL

## 2024-05-22 VITALS
HEART RATE: 91 BPM | SYSTOLIC BLOOD PRESSURE: 132 MMHG | DIASTOLIC BLOOD PRESSURE: 82 MMHG | TEMPERATURE: 98.6 F | RESPIRATION RATE: 16 BRPM

## 2024-05-22 DIAGNOSIS — E83.110 HEREDITARY HEMOCHROMATOSIS (HCC): Primary | ICD-10-CM

## 2024-05-22 LAB
COMMENT:: NORMAL
FERRITIN SERPL-MCNC: 35 NG/ML (ref 26–388)
HGB BLD-MCNC: 15.6 G/DL (ref 11.5–16)
SPECIMEN HOLD: NORMAL

## 2024-05-22 PROCEDURE — 85018 HEMOGLOBIN: CPT

## 2024-05-22 PROCEDURE — 36415 COLL VENOUS BLD VENIPUNCTURE: CPT

## 2024-05-22 PROCEDURE — 82728 ASSAY OF FERRITIN: CPT

## 2024-05-22 ASSESSMENT — PAIN SCALES - GENERAL: PAINLEVEL_OUTOF10: 0

## 2024-05-22 NOTE — PROGRESS NOTES
PEDI [facility] BREMO VISIT NOTE      1500 Patient arrives for Therapeutic Phlebotomy/lab without acute problems. Please see [facility] for complete assessment and education provided.      Peripheral stick to right hand for lab draw; labs sent for processing.      Ms. Armstrong's vitals were reviewed prior to and after treatment.   Patient Vitals for the past 12 hrs:   Temp Pulse Resp BP   05/22/24 1500 98.6 °F (37 °C) 91 16 132/82        Lab results were obtained and reviewed.  Recent Results (from the past 12 hour(s))   Ferritin    Collection Time: 05/22/24  3:13 PM   Result Value Ref Range    Ferritin 35 26 - 388 NG/ML   Extra Tubes Hold    Collection Time: 05/22/24  3:13 PM   Result Value Ref Range    Specimen HOld 1LAV     Comment:        Add-on orders for these samples will be processed based on acceptable specimen integrity and analyte stability, which may vary by analyte.   POCT hemoglobin    Collection Time: 05/22/24  3:13 PM   Result Value Ref Range    POC Hemoglobin 15.6 11.5 - 16.0 g/dL       Patient's labs NOT within parameters for treatment.     Vital signs stable throughout and prior to discharge. Patient tolerated procedure well and was discharged without incident.  Patient is aware of next [facility] appointment on 06/19/2024. Appointment card give to the patient.    Future Appointments   Date Time Provider Department Center   6/19/2024  3:00 PM JENNIFER FASTTRACK 1 [facility]       LUIS ANGEL BONILLA RN  May 22, 2024  4:24 PM

## 2024-06-19 ENCOUNTER — HOSPITAL ENCOUNTER (OUTPATIENT)
Facility: HOSPITAL | Age: 64
Setting detail: INFUSION SERIES
Discharge: HOME | End: 2024-06-19
Payer: COMMERCIAL

## 2024-06-19 VITALS
TEMPERATURE: 98.2 F | SYSTOLIC BLOOD PRESSURE: 144 MMHG | DIASTOLIC BLOOD PRESSURE: 79 MMHG | HEART RATE: 92 BPM | RESPIRATION RATE: 18 BRPM

## 2024-06-19 DIAGNOSIS — E83.110 HEREDITARY HEMOCHROMATOSIS (HCC): ICD-10-CM

## 2024-06-19 LAB
FERRITIN SERPL-MCNC: 78 NG/ML (ref 26–388)
HGB BLD-MCNC: 14.9 G/DL (ref 11.5–16)

## 2024-06-19 PROCEDURE — 99195 PHLEBOTOMY: CPT

## 2024-06-19 PROCEDURE — 82728 ASSAY OF FERRITIN: CPT

## 2024-06-19 PROCEDURE — 85018 HEMOGLOBIN: CPT

## 2024-06-19 PROCEDURE — 36415 COLL VENOUS BLD VENIPUNCTURE: CPT

## 2024-06-19 ASSESSMENT — PAIN SCALES - GENERAL: PAINLEVEL_OUTOF10: 0

## 2024-06-19 NOTE — PROGRESS NOTES
PEDI [facility] VISIT NOTE      1450 Patient arrives for Therapeutic Phlebotomy/lab without acute problems. Please see [facility] for complete assessment and education provided.      Peripheral stick to right hand for lab draw; labs sent for processing.      Vitals Signs:  Patient Vitals for the past 12 hrs:   Temp Pulse Resp BP   06/19/24 1621 -- 92 18 (!) 144/79   06/19/24 1450 98.2 °F (36.8 °C) 85 18 130/85      Labs:  Recent Results (from the past 12 hour(s))   Ferritin    Collection Time: 06/19/24  3:00 PM   Result Value Ref Range    Ferritin 78 26 - 388 NG/ML   POCT hemoglobin    Collection Time: 06/19/24  3:02 PM   Result Value Ref Range    POC Hemoglobin 14.9 11.5 - 16.0 g/dL      Patient's labs within parameters for treatment; 16 gauge needle used for phlebotomy via Left AC; 500ml of whole blood removed, patient tolerated well. Gauze placed over site and pressure bandage applied.     Vital signs stable throughout and prior to discharge, Patient tolerated treatment well and discharged without incident. Patient is aware of next [facility] appointment 07/17/2024.

## 2024-07-17 ENCOUNTER — HOSPITAL ENCOUNTER (OUTPATIENT)
Facility: HOSPITAL | Age: 64
Setting detail: INFUSION SERIES
Discharge: HOME | End: 2024-07-17
Payer: COMMERCIAL

## 2024-07-17 VITALS
HEART RATE: 86 BPM | RESPIRATION RATE: 18 BRPM | OXYGEN SATURATION: 97 % | TEMPERATURE: 97.8 F | DIASTOLIC BLOOD PRESSURE: 88 MMHG | SYSTOLIC BLOOD PRESSURE: 143 MMHG

## 2024-07-17 DIAGNOSIS — E83.119 HEMOCHROMATOSIS, UNSPECIFIED HEMOCHROMATOSIS TYPE: Primary | ICD-10-CM

## 2024-07-17 LAB
FERRITIN SERPL-MCNC: 68 NG/ML (ref 26–388)
HGB BLD-MCNC: 14.9 G/DL (ref 11.5–16)

## 2024-07-17 PROCEDURE — 36415 COLL VENOUS BLD VENIPUNCTURE: CPT

## 2024-07-17 PROCEDURE — 99195 PHLEBOTOMY: CPT

## 2024-07-17 PROCEDURE — 85018 HEMOGLOBIN: CPT

## 2024-07-17 PROCEDURE — 82728 ASSAY OF FERRITIN: CPT

## 2024-07-17 ASSESSMENT — PAIN SCALES - GENERAL: PAINLEVEL_OUTOF10: 0

## 2024-07-17 NOTE — PROGRESS NOTES
PEDI [facility] VISIT NOTE      1300 - Patient arrives for Therapeutic Phlebotomy/lab without acute problems. Please see [facility] for complete assessment and education provided.      Peripheral stick to right hand for lab draw; labs sent for processing.      Ms. Armstrong's vitals were reviewed prior to and after treatment.   Patient Vitals for the past 12 hrs:   Temp Pulse Resp BP SpO2   07/17/24 1415 -- -- 18 (!) 143/88 --   07/17/24 1300 97.8 °F (36.6 °C) 86 18 (!) 148/79 97 %        Lab results were obtained and reviewed.  Recent Results (from the past 12 hour(s))   Ferritin    Collection Time: 07/17/24  1:06 PM   Result Value Ref Range    Ferritin 68 26 - 388 NG/ML   POCT hemoglobin    Collection Time: 07/17/24  1:10 PM   Result Value Ref Range    POC Hemoglobin 14.9 11.5 - 16.0 g/dL       Patient's labs within parameters for treatment; 16 gauge needle used for phlebotomy; 500ml of whole blood removed, patient tolerated well. Gauze placed over site and pressure bandage applied.     Vital signs stable throughout and prior to discharge. Patient tolerated procedure well and was discharged without incident.  Patient is aware of next [facility] appointment on 08/14/2024. Appointment card give to the patient.    Future Appointments   Date Time Provider Department Center   8/14/2024  3:00 PM PEDS FASTTRACK 1 [facility]   9/11/2024  3:00 PM PEDS FASTTRACK 1 [facility]       Samla Garay RN  July 17, 2024  2:20 PM

## 2024-08-12 RX ORDER — 0.9 % SODIUM CHLORIDE 0.9 %
250 INTRAVENOUS SOLUTION INTRAVENOUS ONCE
OUTPATIENT
Start: 2024-08-14 | End: 2024-08-14

## 2024-08-14 ENCOUNTER — HOSPITAL ENCOUNTER (OUTPATIENT)
Facility: HOSPITAL | Age: 64
Setting detail: INFUSION SERIES
Discharge: HOME | End: 2024-08-14
Payer: COMMERCIAL

## 2024-08-14 VITALS
RESPIRATION RATE: 18 BRPM | HEART RATE: 80 BPM | OXYGEN SATURATION: 98 % | TEMPERATURE: 97.9 F | DIASTOLIC BLOOD PRESSURE: 81 MMHG | SYSTOLIC BLOOD PRESSURE: 129 MMHG

## 2024-08-14 DIAGNOSIS — E83.110 HEREDITARY HEMOCHROMATOSIS (HCC): Primary | ICD-10-CM

## 2024-08-14 LAB
FERRITIN SERPL-MCNC: 40 NG/ML (ref 8–252)
HGB BLD-MCNC: 14.5 G/DL (ref 11.5–16)

## 2024-08-14 PROCEDURE — 85018 HEMOGLOBIN: CPT

## 2024-08-14 PROCEDURE — 36415 COLL VENOUS BLD VENIPUNCTURE: CPT

## 2024-08-14 PROCEDURE — 82728 ASSAY OF FERRITIN: CPT

## 2024-08-14 RX ORDER — 0.9 % SODIUM CHLORIDE 0.9 %
250 INTRAVENOUS SOLUTION INTRAVENOUS ONCE
OUTPATIENT
Start: 2024-09-01 | End: 2024-09-01

## 2024-08-14 ASSESSMENT — PAIN SCALES - GENERAL: PAINLEVEL_OUTOF10: 0

## 2024-08-14 NOTE — PROGRESS NOTES
PEDI [facility] BREMO VISIT NOTE      1500 Patient arrives for Therapeutic Phlebotomy/lab without acute problems. Please see [facility] for complete assessment and education provided.      Peripheral stick to R hand (per patients preference)  for lab draw; labs sent for processing.      Ms. Armstrong's vitals were reviewed prior to and after treatment.   Patient Vitals for the past 12 hrs:   Temp Pulse Resp BP SpO2   08/14/24 1500 97.9 °F (36.6 °C) 80 18 129/81 98 %        Lab results were obtained and reviewed.  Recent Results (from the past 12 hour(s))   Ferritin    Collection Time: 08/14/24  3:05 PM   Result Value Ref Range    Ferritin 40 8 - 252 NG/ML   POCT hemoglobin    Collection Time: 08/14/24  3:08 PM   Result Value Ref Range    POC Hemoglobin 14.5 11.5 - 16.0 g/dL       Patient's labs not within parameters for treatment; therefore therapeutic phlebotomy held today.      Vital signs stable throughout and prior to discharge. Patient tolerated procedure well and was discharged without incident.  Patient is aware of next [facility] appointment on 9/11/2024. Appointment card give to the patient.    Future Appointments   Date Time Provider Department Center   9/11/2024  3:00 PM PEDS FASTTRACK 1 [facility]       EDD RODNEY RN  August 14, 2024  3:55 PM

## 2024-09-11 ENCOUNTER — HOSPITAL ENCOUNTER (OUTPATIENT)
Facility: HOSPITAL | Age: 64
Setting detail: INFUSION SERIES
Discharge: HOME | End: 2024-09-11
Payer: COMMERCIAL

## 2024-09-11 VITALS
SYSTOLIC BLOOD PRESSURE: 139 MMHG | DIASTOLIC BLOOD PRESSURE: 92 MMHG | OXYGEN SATURATION: 99 % | TEMPERATURE: 97.8 F | RESPIRATION RATE: 18 BRPM | HEART RATE: 97 BPM

## 2024-09-11 DIAGNOSIS — E83.110 HEREDITARY HEMOCHROMATOSIS (HCC): Primary | ICD-10-CM

## 2024-09-11 LAB
FERRITIN SERPL-MCNC: 51 NG/ML (ref 8–252)
HGB BLD-MCNC: 15 G/DL (ref 11.5–16)

## 2024-09-11 PROCEDURE — 36415 COLL VENOUS BLD VENIPUNCTURE: CPT

## 2024-09-11 PROCEDURE — 85018 HEMOGLOBIN: CPT

## 2024-09-11 PROCEDURE — 82728 ASSAY OF FERRITIN: CPT

## 2024-09-11 PROCEDURE — 99195 PHLEBOTOMY: CPT

## 2024-09-11 RX ORDER — 0.9 % SODIUM CHLORIDE 0.9 %
250 INTRAVENOUS SOLUTION INTRAVENOUS ONCE
OUTPATIENT
Start: 2024-10-01 | End: 2024-10-01

## 2024-09-11 ASSESSMENT — PAIN SCALES - GENERAL
PAINLEVEL_OUTOF10: 0
PAINLEVEL_OUTOF10: 0

## 2024-10-09 ENCOUNTER — HOSPITAL ENCOUNTER (OUTPATIENT)
Facility: HOSPITAL | Age: 64
Setting detail: INFUSION SERIES
Discharge: HOME | End: 2024-10-09
Payer: COMMERCIAL

## 2024-10-09 VITALS
OXYGEN SATURATION: 97 % | DIASTOLIC BLOOD PRESSURE: 77 MMHG | SYSTOLIC BLOOD PRESSURE: 120 MMHG | TEMPERATURE: 97.8 F | HEART RATE: 88 BPM | RESPIRATION RATE: 18 BRPM

## 2024-10-09 DIAGNOSIS — E83.110 HEREDITARY HEMOCHROMATOSIS (HCC): Primary | ICD-10-CM

## 2024-10-09 LAB
FERRITIN SERPL-MCNC: 23 NG/ML (ref 8–252)
HGB BLD-MCNC: 14.4 G/DL (ref 11.5–16)

## 2024-10-09 PROCEDURE — 82728 ASSAY OF FERRITIN: CPT

## 2024-10-09 PROCEDURE — 85018 HEMOGLOBIN: CPT

## 2024-10-09 PROCEDURE — 36415 COLL VENOUS BLD VENIPUNCTURE: CPT

## 2024-10-09 RX ORDER — 0.9 % SODIUM CHLORIDE 0.9 %
250 INTRAVENOUS SOLUTION INTRAVENOUS ONCE
OUTPATIENT
Start: 2024-11-01 | End: 2024-11-01

## 2024-10-09 ASSESSMENT — PAIN SCALES - GENERAL: PAINLEVEL_OUTOF10: 0

## 2024-10-09 NOTE — PROGRESS NOTES
[facility] Peds/Adult Note                       Date: 2024    Name: Carmen Armstrong    MRN: [id number]         : 1960    1455 Patient arrives for Therapeutic Phlebotomy/Lab without acute problems. Please see Epic for complete assessment and education provided.    Vital signs stable throughout and prior to discharge. Patient tolerated procedure well and was discharged without incident.  Patient is aware of next [facility] appointment on 2024.  Appointment card give to the Patient.     Patient's Labs not within parameters for treatment; therefore therapeutic phlebotomy HELD @ this time.      Ms. Armstrong's vitals were reviewed prior to and after treatment.   Patient Vitals for the past 12 hrs:   Temp Pulse Resp BP SpO2   10/09/24 1455 97.8 °F (36.6 °C) 88 18 120/77 97 %     Lab results were obtained and reviewed.  Recent Results (from the past 12 hour(s))   Ferritin    Collection Time: 10/09/24  3:06 PM   Result Value Ref Range    Ferritin 23 8 - 252 NG/ML   POCT hemoglobin    Collection Time: 10/09/24  3:06 PM   Result Value Ref Range    POC Hemoglobin 14.4 11.5 - 16.0 g/dL         Ms. Armstrong tolerated the infusion, and had no complaints.    [facility].     Future Appointments   Date Time Provider Department Center   2024  3:00 PM PEDS FASTTRACK 1 [facility]       CHUCK GUTIERREZ RN  2024  4:00 PM

## 2024-11-08 ENCOUNTER — HOSPITAL ENCOUNTER (OUTPATIENT)
Facility: HOSPITAL | Age: 64
Setting detail: INFUSION SERIES
Discharge: HOME | End: 2024-11-08
Payer: COMMERCIAL

## 2024-11-08 VITALS
HEART RATE: 80 BPM | RESPIRATION RATE: 18 BRPM | SYSTOLIC BLOOD PRESSURE: 131 MMHG | TEMPERATURE: 98 F | DIASTOLIC BLOOD PRESSURE: 83 MMHG | OXYGEN SATURATION: 97 %

## 2024-11-08 DIAGNOSIS — E83.110 HEREDITARY HEMOCHROMATOSIS (HCC): Primary | ICD-10-CM

## 2024-11-08 LAB
FERRITIN SERPL-MCNC: 32 NG/ML (ref 8–252)
HGB BLD-MCNC: 14.1 G/DL (ref 11.5–16)

## 2024-11-08 PROCEDURE — 82728 ASSAY OF FERRITIN: CPT

## 2024-11-08 PROCEDURE — 36415 COLL VENOUS BLD VENIPUNCTURE: CPT

## 2024-11-08 PROCEDURE — 85018 HEMOGLOBIN: CPT

## 2024-11-08 RX ORDER — 0.9 % SODIUM CHLORIDE 0.9 %
250 INTRAVENOUS SOLUTION INTRAVENOUS ONCE
OUTPATIENT
Start: 2024-12-01 | End: 2024-12-01

## 2024-11-08 ASSESSMENT — PAIN SCALES - GENERAL: PAINLEVEL_OUTOF10: 0

## 2024-11-08 NOTE — PROGRESS NOTES
[facility] Peds/Adult Note                   Date: 2024    Name: Carmen Armstrong    MRN: [id number]       : 1960    1500 Patient arrives for Lab/Therapeutic Phlebotomy without acute problems. Please see Epic for complete assessment and education provided.     Patient's Labs not within parameters for treatment; therefore therapeutic phlebotomy HELD at this time.     Vital signs stable throughout and prior to discharge. Patient tolerated procedure well and was discharged without incident.  Patient is aware of next [facility] appointment on 2024.  Appointment card give to the Patient.    Ms. Armstrong's vitals were reviewed prior to and after treatment.   Patient Vitals for the past 12 hrs:   Temp Pulse Resp BP SpO2   24 1510 98 °F (36.7 °C) 80 18 131/83 97 %       Lab results were obtained and reviewed.  Recent Results (from the past 12 hour(s))   Ferritin    Collection Time: 24  3:12 PM   Result Value Ref Range    Ferritin 32 8 - 252 NG/ML   POCT hemoglobin    Collection Time: 24  3:16 PM   Result Value Ref Range    POC Hemoglobin 14.1 11.5 - 16.0 g/dL       Medications given: None        Ms. Armstrong tolerated the infusion, and had no complaints.    [facility]. Armstrong was [facility] stable condition.     Future Appointments   Date Time Provider Department Center   2024  2:00 PM PEDS FASTTRACK 2 [facility]       EDD RODNEY RN  2024  4:22 PM   
ear pain/ ringing

## 2024-12-11 ENCOUNTER — HOSPITAL ENCOUNTER (OUTPATIENT)
Facility: HOSPITAL | Age: 64
Setting detail: INFUSION SERIES
Discharge: HOME | End: 2024-12-11
Payer: COMMERCIAL

## 2024-12-11 VITALS
HEART RATE: 82 BPM | DIASTOLIC BLOOD PRESSURE: 81 MMHG | RESPIRATION RATE: 20 BRPM | TEMPERATURE: 97.7 F | SYSTOLIC BLOOD PRESSURE: 122 MMHG | OXYGEN SATURATION: 98 %

## 2024-12-11 DIAGNOSIS — E83.110 HEREDITARY HEMOCHROMATOSIS (HCC): Primary | ICD-10-CM

## 2024-12-11 LAB
FERRITIN SERPL-MCNC: 37 NG/ML (ref 8–252)
HGB BLD-MCNC: 14.8 G/DL (ref 11.5–16)

## 2024-12-11 PROCEDURE — 82728 ASSAY OF FERRITIN: CPT

## 2024-12-11 PROCEDURE — 36415 COLL VENOUS BLD VENIPUNCTURE: CPT

## 2024-12-11 PROCEDURE — 85018 HEMOGLOBIN: CPT

## 2024-12-11 RX ORDER — 0.9 % SODIUM CHLORIDE 0.9 %
250 INTRAVENOUS SOLUTION INTRAVENOUS ONCE
OUTPATIENT
Start: 2025-01-05 | End: 2025-01-05

## 2024-12-11 ASSESSMENT — PAIN SCALES - GENERAL: PAINLEVEL_OUTOF10: 0

## 2024-12-11 NOTE — PROGRESS NOTES
[facility] Peds/Adult Note                       Date: 2024    Name: Carmen Armstrong    MRN: [id number]         : 1960    1400 Patient arrives for labs and possible phlebotomy without acute problems. Please see Epic for complete assessment and education provided.    Patient tolerated lab draw without difficulty. Patient waited 1 hour and twenty minutes for results, however the lab informed us that their machine was not working and it would take another hour or so for the ferritin level to result. Patient chose to not wait on the lab and was discharged. Patient did not receive therapeutic phlebotomy today. [facility] will call patient with results.     Vital signs stable throughout and prior to discharge.     Ms. Armstrong's vitals were reviewed prior to and after treatment.   Patient Vitals for the past 12 hrs:   Temp Pulse Resp BP SpO2   24 1400 97.7 °F (36.5 °C) 82 20 122/81 98 %       Lab results were obtained and reviewed.  Recent Results (from the past 12 hour(s))   Hemoglobin    Collection Time: 24  2:02 PM   Result Value Ref Range    Hemoglobin 14.8 11.5 - 16.0 g/dL       Ms. Armstrong tolerated the infusion, and had no complaints.    [facility]. Armstrong was [facility] condition.     No future appointments.    Mary Mayo RN  2024  4:22 PM

## 2025-01-08 ENCOUNTER — HOSPITAL ENCOUNTER (OUTPATIENT)
Facility: HOSPITAL | Age: 65
Setting detail: INFUSION SERIES
Discharge: HOME | End: 2025-01-08
Payer: COMMERCIAL

## 2025-01-08 VITALS
TEMPERATURE: 97.6 F | OXYGEN SATURATION: 96 % | RESPIRATION RATE: 20 BRPM | DIASTOLIC BLOOD PRESSURE: 82 MMHG | HEART RATE: 94 BPM | SYSTOLIC BLOOD PRESSURE: 125 MMHG

## 2025-01-08 DIAGNOSIS — E83.110 HEREDITARY HEMOCHROMATOSIS (HCC): Primary | ICD-10-CM

## 2025-01-08 LAB
FERRITIN SERPL-MCNC: 38 NG/ML (ref 8–252)
HGB BLD-MCNC: 14.8 G/DL (ref 11.5–16)

## 2025-01-08 PROCEDURE — 82728 ASSAY OF FERRITIN: CPT

## 2025-01-08 PROCEDURE — 36415 COLL VENOUS BLD VENIPUNCTURE: CPT

## 2025-01-08 PROCEDURE — 85018 HEMOGLOBIN: CPT

## 2025-01-08 RX ORDER — 0.9 % SODIUM CHLORIDE 0.9 %
250 INTRAVENOUS SOLUTION INTRAVENOUS ONCE
OUTPATIENT
Start: 2025-02-05 | End: 2025-02-05

## 2025-01-08 ASSESSMENT — PAIN SCALES - GENERAL: PAINLEVEL_OUTOF10: 0

## 2025-01-08 NOTE — PROGRESS NOTES
[facility] Peds/Adult Note                       Date: 2025    Name: Carmen Armstrong    MRN: [id number]         : 1960    1355 Patient arrives for Therapeutic Phlebotomy/Labs without acute problems. Please see Epic for complete assessment and education provided.    Vital signs stable throughout and prior to discharge. Patient tolerated procedure well and was discharged without incident.  Patient is aware of next [facility] appointment on 2025.  Appointment card give to the Patient.     Patient's Labs not within parameters for treatment therefore therapeutic phlebotomy HELD @ this time.     Ms. Armstrong's vitals were reviewed prior to and after treatment.   Patient Vitals for the past 12 hrs:   Temp Pulse Resp BP SpO2   25 1355 97.6 °F (36.4 °C) 94 20 125/82 96 %         Lab results were obtained and reviewed.  Recent Results (from the past 12 hour(s))   Ferritin    Collection Time: 25  2:01 PM   Result Value Ref Range    Ferritin 38 8 - 252 NG/ML   Hemoglobin    Collection Time: 25  2:01 PM   Result Value Ref Range    Hemoglobin 14.8 11.5 - 16.0 g/dL         Ms. Armstrong tolerated the lab draw, and had no complaints.    [facility]. Armstrong was [facility] condition.     Future Appointments   Date Time Provider Department Center   2025  2:00 PM PEDS FASTTRACK 2 [facility]       CHUCK GUTIERREZ RN  2025  3:23 PM

## 2025-02-05 ENCOUNTER — HOSPITAL ENCOUNTER (OUTPATIENT)
Facility: HOSPITAL | Age: 65
Setting detail: INFUSION SERIES
Discharge: HOME | End: 2025-02-05
Payer: COMMERCIAL

## 2025-02-05 VITALS
DIASTOLIC BLOOD PRESSURE: 69 MMHG | TEMPERATURE: 97.6 F | RESPIRATION RATE: 18 BRPM | SYSTOLIC BLOOD PRESSURE: 117 MMHG | HEART RATE: 89 BPM | OXYGEN SATURATION: 96 %

## 2025-02-05 DIAGNOSIS — E83.110 HEREDITARY HEMOCHROMATOSIS (HCC): Primary | ICD-10-CM

## 2025-02-05 LAB
FERRITIN SERPL-MCNC: 57 NG/ML (ref 8–252)
HGB BLD-MCNC: 14.5 G/DL (ref 11.5–16)

## 2025-02-05 PROCEDURE — 36415 COLL VENOUS BLD VENIPUNCTURE: CPT

## 2025-02-05 PROCEDURE — 85018 HEMOGLOBIN: CPT

## 2025-02-05 PROCEDURE — 82728 ASSAY OF FERRITIN: CPT

## 2025-02-05 PROCEDURE — 99195 PHLEBOTOMY: CPT

## 2025-02-05 RX ORDER — 0.9 % SODIUM CHLORIDE 0.9 %
250 INTRAVENOUS SOLUTION INTRAVENOUS ONCE
OUTPATIENT
Start: 2025-03-05 | End: 2025-03-05

## 2025-02-05 RX ORDER — 0.9 % SODIUM CHLORIDE 0.9 %
250 INTRAVENOUS SOLUTION INTRAVENOUS ONCE
Status: DISCONTINUED | OUTPATIENT
Start: 2025-02-05 | End: 2025-02-06 | Stop reason: HOSPADM

## 2025-02-05 ASSESSMENT — PAIN SCALES - GENERAL: PAINLEVEL_OUTOF10: 0

## 2025-02-05 NOTE — PROGRESS NOTES
PEDI \[facility]"" BREMO VISIT NOTE      1400 Patient arrives for Therapeutic Phlebotomy/lab without acute problems. Please see [facility] for complete assessment and education provided.      Peripheral stick to left hand for lab draw; labs sent for processing.      Ms. Armstrong's vitals were reviewed prior to and after treatment.   Patient Vitals for the past 12 hrs:   Temp Pulse Resp BP SpO2   02/05/25 1545 -- 89 18 117/69 --   02/05/25 1355 97.6 °F (36.4 °C) 91 18 134/83 96 %        Lab results were obtained and reviewed.  Recent Results (from the past 12 hour(s))   Hemoglobin    Collection Time: 02/05/25  1:58 PM   Result Value Ref Range    Hemoglobin 14.5 11.5 - 16.0 g/dL   Ferritin    Collection Time: 02/05/25  1:58 PM   Result Value Ref Range    Ferritin 57 8 - 252 NG/ML       Patient's labs within parameters for treatment; 16 gauge needle used for phlebotomy; 500ml of whole blood removed, patient tolerated well. Gauze placed over site and pressure bandage applied.     Vital signs stable throughout and prior to discharge. Patient tolerated procedure well and was discharged without incident.  Patient is aware of next \[facility]\"" appointment on March 5, 2025. Appointment card give to the patient.     Future Appointments   Date Time Provider Department Center   3/5/2025  2:00 PM [facility]       EDD RODNEY RN  February 5, 2025  3:50 PM

## 2025-02-05 NOTE — PLAN OF CARE
Patient tolerated therapeutic phlebotomy without difficulty   Problem: Pain  Goal: Verbalizes/displays adequate comfort level or baseline comfort level  Outcome: Progressing

## 2025-03-05 ENCOUNTER — HOSPITAL ENCOUNTER (OUTPATIENT)
Facility: HOSPITAL | Age: 65
Setting detail: INFUSION SERIES
Discharge: HOME | End: 2025-03-05
Payer: COMMERCIAL

## 2025-03-05 VITALS
OXYGEN SATURATION: 96 % | HEART RATE: 90 BPM | RESPIRATION RATE: 18 BRPM | SYSTOLIC BLOOD PRESSURE: 114 MMHG | TEMPERATURE: 97.8 F | DIASTOLIC BLOOD PRESSURE: 78 MMHG

## 2025-03-05 DIAGNOSIS — E83.110 HEREDITARY HEMOCHROMATOSIS: Primary | ICD-10-CM

## 2025-03-05 LAB
FERRITIN SERPL-MCNC: 24 NG/ML (ref 8–252)
HGB BLD-MCNC: 15.1 G/DL (ref 11.5–16)

## 2025-03-05 PROCEDURE — 82728 ASSAY OF FERRITIN: CPT

## 2025-03-05 PROCEDURE — 85018 HEMOGLOBIN: CPT

## 2025-03-05 RX ORDER — 0.9 % SODIUM CHLORIDE 0.9 %
250 INTRAVENOUS SOLUTION INTRAVENOUS ONCE
Status: DISCONTINUED | OUTPATIENT
Start: 2025-03-05 | End: 2025-03-06 | Stop reason: HOSPADM

## 2025-03-05 RX ORDER — 0.9 % SODIUM CHLORIDE 0.9 %
250 INTRAVENOUS SOLUTION INTRAVENOUS ONCE
OUTPATIENT
Start: 2025-04-02 | End: 2025-04-02

## 2025-03-05 ASSESSMENT — PAIN SCALES - GENERAL: PAINLEVEL_OUTOF10: 0

## 2025-04-02 ENCOUNTER — HOSPITAL ENCOUNTER (OUTPATIENT)
Facility: HOSPITAL | Age: 65
Setting detail: INFUSION SERIES
Discharge: HOME | End: 2025-04-02
Payer: COMMERCIAL

## 2025-04-02 VITALS
OXYGEN SATURATION: 97 % | SYSTOLIC BLOOD PRESSURE: 98 MMHG | DIASTOLIC BLOOD PRESSURE: 60 MMHG | RESPIRATION RATE: 18 BRPM | TEMPERATURE: 97.5 F | HEART RATE: 86 BPM

## 2025-04-02 DIAGNOSIS — E83.110 HEREDITARY HEMOCHROMATOSIS: Primary | ICD-10-CM

## 2025-04-02 LAB
FERRITIN SERPL-MCNC: 25 NG/ML (ref 26–388)
HGB BLD-MCNC: 15.8 G/DL (ref 11.5–16)

## 2025-04-02 PROCEDURE — 85018 HEMOGLOBIN: CPT

## 2025-04-02 PROCEDURE — 82728 ASSAY OF FERRITIN: CPT

## 2025-04-02 RX ORDER — 0.9 % SODIUM CHLORIDE 0.9 %
250 INTRAVENOUS SOLUTION INTRAVENOUS ONCE
Status: DISCONTINUED | OUTPATIENT
Start: 2025-04-02 | End: 2025-04-02

## 2025-04-02 RX ORDER — 0.9 % SODIUM CHLORIDE 0.9 %
250 INTRAVENOUS SOLUTION INTRAVENOUS ONCE
OUTPATIENT
Start: 2025-04-30 | End: 2025-04-30

## 2025-04-02 ASSESSMENT — PAIN SCALES - GENERAL: PAINLEVEL_OUTOF10: 0

## 2025-04-02 NOTE — PROGRESS NOTES
[facility] Peds/Adult Note                       Date: 2025    Name: Carmen Armstrong    MRN: [id number]         : 1960    1355 Patient arrives for Labs/Therapeutic Phlebotomy without acute problems. Please see Epic for complete assessment and education provided.    Vital signs stable throughout and prior to discharge. Patient tolerated procedure well and was discharged without incident.  Patient is aware of next [facility] appointment on 04/3/2025.  Appointment card give to the Patient.     Patient's Labs not within parameters for treatment therefore Therapeutic Phlebotomy HELD @ this time.       Ms. Armstrong's vitals were reviewed prior to and after treatment.   Patient Vitals for the past 12 hrs:   Temp Pulse Resp BP SpO2   25 1355 97.5 °F (36.4 °C) 86 18 98/60 97 %         Lab results were obtained and reviewed.  Recent Results (from the past 12 hours)   Ferritin    Collection Time: 25  2:03 PM   Result Value Ref Range    Ferritin 25 (L) 26 - 388 NG/ML   Hemoglobin    Collection Time: 25  2:03 PM   Result Value Ref Range    Hemoglobin 15.8 11.5 - 16.0 g/dL         Ms. Armstrong tolerated the lab draw, and had no complaints.    Ms. Armstrong was [facility] stable condition.     Future Appointments   Date Time Provider Department Center   2025  2:00 PM PEDS INF CHAIR 2 [facility]       CHUCK GUTIERREZ RN  2025  3:59 PM

## 2025-04-30 ENCOUNTER — HOSPITAL ENCOUNTER (OUTPATIENT)
Facility: HOSPITAL | Age: 65
Setting detail: INFUSION SERIES
Discharge: HOME | End: 2025-04-30
Payer: COMMERCIAL

## 2025-04-30 VITALS
HEART RATE: 88 BPM | TEMPERATURE: 97.7 F | RESPIRATION RATE: 18 BRPM | OXYGEN SATURATION: 97 % | DIASTOLIC BLOOD PRESSURE: 80 MMHG | SYSTOLIC BLOOD PRESSURE: 130 MMHG

## 2025-04-30 DIAGNOSIS — E83.110 HEREDITARY HEMOCHROMATOSIS: Primary | ICD-10-CM

## 2025-04-30 LAB
FERRITIN SERPL-MCNC: 22 NG/ML (ref 26–388)
HGB BLD-MCNC: 15.3 G/DL (ref 11.5–16)

## 2025-04-30 PROCEDURE — 82728 ASSAY OF FERRITIN: CPT

## 2025-04-30 PROCEDURE — 85018 HEMOGLOBIN: CPT

## 2025-04-30 RX ORDER — 0.9 % SODIUM CHLORIDE 0.9 %
250 INTRAVENOUS SOLUTION INTRAVENOUS ONCE
OUTPATIENT
Start: 2025-05-28 | End: 2025-05-28

## 2025-04-30 ASSESSMENT — PAIN SCALES - GENERAL: PAINLEVEL_OUTOF10: 0

## 2025-04-30 NOTE — PROGRESS NOTES
[facility] Peds/Adult Note                       Date: 2025    Name: Carmen Armstrong    MRN: [id number]         : 1960    1400 Patient arrives for Labs/possible Therapeutic Phlebotomy without acute problems. Please see Epic for complete assessment and education provided.    Vital signs stable throughout and prior to discharge. Patient tolerated procedure well and was discharged without incident.  Patient is aware of next [facility] appointment on 2025.  Appointment card give to the Patient.     Patient's Labs not within parameters for treatment therefore Therapeutic Phlebotomy HELD @ this time.      Ms. Armstrong's vitals were reviewed prior to and after treatment.   Patient Vitals for the past 12 hrs:   Temp Pulse Resp BP SpO2   25 1400 97.7 °F (36.5 °C) 88 18 130/80 97 %       Lab results were obtained and reviewed.  Recent Results (from the past 12 hours)   Ferritin    Collection Time: 25  2:03 PM   Result Value Ref Range    Ferritin 22 (L) 26 - 388 NG/ML   Hemoglobin    Collection Time: 25  2:03 PM   Result Value Ref Range    Hemoglobin 15.3 11.5 - 16.0 g/dL         Ms. Armstrong tolerated the lab draw, and had no complaints.    Ms. Armstrong was [facility] condition.     Future Appointments   Date Time Provider Department Center   2025  2:00 PM PEDS INF CHAIR 1 [facility]       CHUCK GUTIREREZ RN  2025  3:35 PM

## 2025-05-28 ENCOUNTER — HOSPITAL ENCOUNTER (OUTPATIENT)
Facility: HOSPITAL | Age: 65
Setting detail: INFUSION SERIES
Discharge: HOME | End: 2025-05-28
Payer: COMMERCIAL

## 2025-05-28 VITALS
SYSTOLIC BLOOD PRESSURE: 123 MMHG | DIASTOLIC BLOOD PRESSURE: 87 MMHG | HEART RATE: 92 BPM | RESPIRATION RATE: 18 BRPM | TEMPERATURE: 97.4 F | OXYGEN SATURATION: 96 %

## 2025-05-28 DIAGNOSIS — E83.110 HEREDITARY HEMOCHROMATOSIS: ICD-10-CM

## 2025-05-28 LAB
FERRITIN SERPL-MCNC: 38 NG/ML (ref 8–252)
HGB BLD-MCNC: 16.1 G/DL (ref 11.5–16)

## 2025-05-28 PROCEDURE — 85018 HEMOGLOBIN: CPT

## 2025-05-28 PROCEDURE — 82728 ASSAY OF FERRITIN: CPT

## 2025-05-28 RX ORDER — 0.9 % SODIUM CHLORIDE 0.9 %
250 INTRAVENOUS SOLUTION INTRAVENOUS ONCE
OUTPATIENT
Start: 2025-06-25 | End: 2025-06-25

## 2025-05-28 ASSESSMENT — PAIN SCALES - GENERAL: PAINLEVEL_OUTOF10: 0

## 2025-05-28 NOTE — PROGRESS NOTES
[facility] Peds/Adult Note                       Date: May 28, 2025    Name: Carmen Armstrong    MRN: [id number]         : 1960    1340 Patient arrives for Labs/possible Therapeutic Phlebotomy without acute problems. Please see Epic for complete assessment and education provided.    Vital signs stable throughout and prior to discharge. Patient tolerated procedure well and was discharged without incident.  Patient is aware of next [facility] appointment on 2025. Appointment card give to the Patient.       Ms. Armstrong's vitals were reviewed prior to and after treatment.   Patient Vitals for the past 12 hrs:   Temp Pulse Resp BP SpO2   25 1340 97.4 °F (36.3 °C) 92 18 123/87 96 %         Lab results were obtained and reviewed.  Recent Results (from the past 12 hours)   Hemoglobin    Collection Time: 25  1:45 PM   Result Value Ref Range    Hemoglobin 16.1 (H) 11.5 - 16.0 g/dL   Ferritin    Collection Time: 25  1:45 PM   Result Value Ref Range    Ferritin 38 8 - 252 NG/ML       Medications given:   Labs not within parameters for treatment; Therapeutic Phlebotomy HELD at this time.     Ms. Armstrong tolerated the lab draw, and had no complaints.    [facility]. Armstrong was [facility] condition.     Future Appointments   Date Time Provider Department Center   2025  2:00 PM PEDS INF CHAIR 5 [facility]       CHUCK GUTIERREZ RN  May 28, 2025  4:11 PM

## 2025-06-25 ENCOUNTER — HOSPITAL ENCOUNTER (OUTPATIENT)
Facility: HOSPITAL | Age: 65
Setting detail: INFUSION SERIES
Discharge: HOME | End: 2025-06-25
Payer: COMMERCIAL

## 2025-06-25 VITALS
HEART RATE: 92 BPM | OXYGEN SATURATION: 96 % | DIASTOLIC BLOOD PRESSURE: 84 MMHG | SYSTOLIC BLOOD PRESSURE: 126 MMHG | RESPIRATION RATE: 18 BRPM | TEMPERATURE: 97.4 F

## 2025-06-25 DIAGNOSIS — E83.110 HEREDITARY HEMOCHROMATOSIS: Primary | ICD-10-CM

## 2025-06-25 LAB
FERRITIN SERPL-MCNC: 82 NG/ML (ref 26–388)
HGB BLD-MCNC: 14.4 G/DL (ref 11.5–16)

## 2025-06-25 PROCEDURE — 82728 ASSAY OF FERRITIN: CPT

## 2025-06-25 PROCEDURE — 85018 HEMOGLOBIN: CPT

## 2025-06-25 PROCEDURE — 99195 PHLEBOTOMY: CPT

## 2025-06-25 RX ORDER — 0.9 % SODIUM CHLORIDE 0.9 %
250 INTRAVENOUS SOLUTION INTRAVENOUS ONCE
OUTPATIENT
Start: 2025-07-23 | End: 2025-07-23

## 2025-06-25 ASSESSMENT — PAIN SCALES - GENERAL
PAINLEVEL_OUTOF10: 0
PAINLEVEL_OUTOF10: 0

## 2025-06-25 NOTE — PROGRESS NOTES
PEDI [facility] VISIT NOTE      1345 Patient arrives for Therapeutic Phlebotomy/lab without acute problems. Please see EPIC for complete assessment and education provided.      Peripheral stick to right hand for lab draw; labs sent for processing.      Vitals Signs:  Patient Vitals for the past 12 hrs:   Temp Pulse Resp BP SpO2   06/25/25 1540 97.4 °F (36.3 °C) 92 18 126/84 --   06/25/25 1345 97.2 °F (36.2 °C) 86 18 121/77 96 %        Labs:  Recent Results (from the past 12 hours)   Ferritin    Collection Time: 06/25/25  1:51 PM   Result Value Ref Range    Ferritin 82 26 - 388 NG/ML   Hemoglobin    Collection Time: 06/25/25  1:51 PM   Result Value Ref Range    Hemoglobin 14.4 11.5 - 16.0 g/dL      Patient's labs within parameters for treatment; 16 gauge needle used for phlebotomy via Left AC; 500ml of whole blood removed, patient tolerated well. Gauze placed over site and pressure bandage applied.     Vital signs stable throughout and prior to discharge, Patient tolerated treatment well and discharged without incident. Patient is aware of next [facility] appointment 07/23/2025.

## 2025-07-23 ENCOUNTER — HOSPITAL ENCOUNTER (OUTPATIENT)
Facility: HOSPITAL | Age: 65
Setting detail: INFUSION SERIES
Discharge: HOME | End: 2025-07-23
Payer: COMMERCIAL

## 2025-07-23 VITALS
OXYGEN SATURATION: 96 % | TEMPERATURE: 97.5 F | SYSTOLIC BLOOD PRESSURE: 134 MMHG | RESPIRATION RATE: 18 BRPM | HEART RATE: 85 BPM | DIASTOLIC BLOOD PRESSURE: 80 MMHG

## 2025-07-23 DIAGNOSIS — E83.110 HEREDITARY HEMOCHROMATOSIS: Primary | ICD-10-CM

## 2025-07-23 LAB
FERRITIN SERPL-MCNC: 29 NG/ML (ref 26–388)
HGB BLD-MCNC: 14.6 G/DL (ref 11.5–16)

## 2025-07-23 PROCEDURE — 85018 HEMOGLOBIN: CPT

## 2025-07-23 PROCEDURE — 82728 ASSAY OF FERRITIN: CPT

## 2025-07-23 RX ORDER — 0.9 % SODIUM CHLORIDE 0.9 %
250 INTRAVENOUS SOLUTION INTRAVENOUS ONCE
OUTPATIENT
Start: 2025-07-23 | End: 2025-07-23

## 2025-07-23 ASSESSMENT — PAIN SCALES - GENERAL: PAINLEVEL_OUTOF10: 0

## 2025-07-23 NOTE — PROGRESS NOTES
[facility] Peds/Adult Note                       Date: 2025    Name: Carmen Armstrong    MRN: [id number]         : 1960    1435 Patient arrives for Labs/Possible Therapeutic Phlebotomy without acute problems. Please see Epic for complete assessment and education provided.    Vital signs stable throughout and prior to discharge. Patient tolerated procedure well and was discharged without incident.  Patient is aware of next [facility] appointment on 2025.  Appointment card give to the Patient.       Ms. Armstrong's vitals were reviewed prior to and after treatment.   Patient Vitals for the past 12 hrs:   Temp Pulse Resp BP SpO2   25 1435 97.5 °F (36.4 °C) 85 18 134/80 96 %         Lab results were obtained and reviewed.  Recent Results (from the past 12 hours)   Hemoglobin    Collection Time: 25  2:41 PM   Result Value Ref Range    Hemoglobin 14.6 11.5 - 16.0 g/dL   Ferritin    Collection Time: 25  2:41 PM   Result Value Ref Range    Ferritin 29 26 - 388 NG/ML       Labs not within parameters for treatment; Therapeutic Phlebotomy HELD at this time.     Ms. Armstrong tolerated the lab draw without difficulty.    Ms. Armstrong was [facility] condition.     Future Appointments   Date Time Provider Department Center   2025  2:00 PM PEDS INF CHAIR 2 [facility]       CHUCK GUTIERREZ RN  2025  4:29 PM

## 2025-08-14 RX ORDER — 0.9 % SODIUM CHLORIDE 0.9 %
250 INTRAVENOUS SOLUTION INTRAVENOUS ONCE
OUTPATIENT
Start: 2025-08-14 | End: 2025-08-14

## 2025-08-20 ENCOUNTER — HOSPITAL ENCOUNTER (OUTPATIENT)
Facility: HOSPITAL | Age: 65
Setting detail: INFUSION SERIES
Discharge: HOME | End: 2025-08-20
Payer: COMMERCIAL

## 2025-08-20 VITALS
OXYGEN SATURATION: 95 % | HEART RATE: 82 BPM | SYSTOLIC BLOOD PRESSURE: 114 MMHG | DIASTOLIC BLOOD PRESSURE: 80 MMHG | RESPIRATION RATE: 18 BRPM | TEMPERATURE: 97.8 F

## 2025-08-20 DIAGNOSIS — E83.110 HEREDITARY HEMOCHROMATOSIS: Primary | ICD-10-CM

## 2025-08-20 LAB
FERRITIN SERPL-MCNC: 47 NG/ML (ref 13–252)
HGB BLD-MCNC: 14.9 G/DL (ref 11.5–16)

## 2025-08-20 PROCEDURE — 36415 COLL VENOUS BLD VENIPUNCTURE: CPT

## 2025-08-20 PROCEDURE — 82728 ASSAY OF FERRITIN: CPT

## 2025-08-20 PROCEDURE — 85018 HEMOGLOBIN: CPT

## 2025-08-20 RX ORDER — 0.9 % SODIUM CHLORIDE 0.9 %
250 INTRAVENOUS SOLUTION INTRAVENOUS ONCE
OUTPATIENT
Start: 2025-08-20 | End: 2025-08-20

## 2025-08-20 ASSESSMENT — PAIN SCALES - GENERAL: PAINLEVEL_OUTOF10: 0
